# Patient Record
Sex: MALE | Race: BLACK OR AFRICAN AMERICAN | NOT HISPANIC OR LATINO | Employment: OTHER | URBAN - METROPOLITAN AREA
[De-identification: names, ages, dates, MRNs, and addresses within clinical notes are randomized per-mention and may not be internally consistent; named-entity substitution may affect disease eponyms.]

---

## 2019-09-19 ENCOUNTER — HOSPITAL ENCOUNTER (EMERGENCY)
Facility: HOSPITAL | Age: 64
Discharge: HOME | End: 2019-09-19
Attending: EMERGENCY MEDICINE
Payer: MEDICAID

## 2019-09-19 VITALS
SYSTOLIC BLOOD PRESSURE: 164 MMHG | TEMPERATURE: 98 F | OXYGEN SATURATION: 97 % | HEIGHT: 69 IN | HEART RATE: 81 BPM | BODY MASS INDEX: 28.58 KG/M2 | RESPIRATION RATE: 18 BRPM | DIASTOLIC BLOOD PRESSURE: 102 MMHG | WEIGHT: 193 LBS

## 2019-09-19 DIAGNOSIS — S01.511A LIP LACERATION, INITIAL ENCOUNTER: Primary | ICD-10-CM

## 2019-09-19 PROCEDURE — 90471 IMMUNIZATION ADMIN: CPT | Performed by: PHYSICIAN ASSISTANT

## 2019-09-19 PROCEDURE — 90715 TDAP VACCINE 7 YRS/> IM: CPT | Performed by: PHYSICIAN ASSISTANT

## 2019-09-19 PROCEDURE — 99282 EMERGENCY DEPT VISIT SF MDM: CPT | Mod: 25

## 2019-09-19 PROCEDURE — 63600175 PHARM REV CODE 636 W HCPCS: Performed by: PHYSICIAN ASSISTANT

## 2019-09-19 PROCEDURE — 12011 RPR F/E/E/N/L/M 2.5 CM/<: CPT

## 2019-09-19 PROCEDURE — 25000003 PHARM REV CODE 250: Performed by: PHYSICIAN ASSISTANT

## 2019-09-19 RX ORDER — LIDOCAINE HYDROCHLORIDE 10 MG/ML
10 INJECTION INFILTRATION; PERINEURAL
Status: COMPLETED | OUTPATIENT
Start: 2019-09-19 | End: 2019-09-19

## 2019-09-19 RX ORDER — LIDOCAINE HYDROCHLORIDE 20 MG/ML
5 SOLUTION OROPHARYNGEAL
Status: COMPLETED | OUTPATIENT
Start: 2019-09-19 | End: 2019-09-19

## 2019-09-19 RX ORDER — BACITRACIN ZINC 500 UNIT/G
OINTMENT (GRAM) TOPICAL 2 TIMES DAILY
Qty: 30 G | Refills: 0 | Status: SHIPPED | OUTPATIENT
Start: 2019-09-19

## 2019-09-19 RX ORDER — IBUPROFEN 400 MG/1
800 TABLET ORAL
Status: COMPLETED | OUTPATIENT
Start: 2019-09-19 | End: 2019-09-19

## 2019-09-19 RX ADMIN — IBUPROFEN 800 MG: 400 TABLET, FILM COATED ORAL at 07:09

## 2019-09-19 RX ADMIN — BACITRACIN, NEOMYCIN, POLYMYXIN B 1 EACH: 400; 3.5; 5 OINTMENT TOPICAL at 08:09

## 2019-09-19 RX ADMIN — LIDOCAINE HYDROCHLORIDE 5 ML: 20 SOLUTION ORAL; TOPICAL at 07:09

## 2019-09-19 RX ADMIN — CLOSTRIDIUM TETANI TOXOID ANTIGEN (FORMALDEHYDE INACTIVATED), CORYNEBACTERIUM DIPHTHERIAE TOXOID ANTIGEN (FORMALDEHYDE INACTIVATED), BORDETELLA PERTUSSIS TOXOID ANTIGEN (GLUTARALDEHYDE INACTIVATED), BORDETELLA PERTUSSIS FILAMENTOUS HEMAGGLUTININ ANTIGEN (FORMALDEHYDE INACTIVATED), BORDETELLA PERTUSSIS PERTACTIN ANTIGEN, AND BORDETELLA PERTUSSIS FIMBRIAE 2/3 ANTIGEN 0.5 ML: 5; 2; 2.5; 5; 3; 5 INJECTION, SUSPENSION INTRAMUSCULAR at 06:09

## 2019-09-19 RX ADMIN — LIDOCAINE HYDROCHLORIDE 10 ML: 10 INJECTION, SOLUTION INFILTRATION; PERINEURAL at 07:09
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
## 2019-09-20 NOTE — DISCHARGE INSTRUCTIONS
Apply bacitracin to the laceration twice daily.  Keep the area clean and dry. Wash with mild soap and water and pat dry immediately after.  Follow-up with your primary care physician for suture removal within 5-7 days.  Return to the ED for any concerning symptoms.    Our goal in the emergency department is to always give you outstanding care and exceptional service. You may receive a survey by mail or e-mail in the next week regarding your experience in our ED. We would greatly appreciate your completing and returning the survey. Your feedback provides us with a way to recognize our staff who give very good care and it helps us learn how to improve when your experience was below our aspiration of excellence.

## 2021-07-08 ENCOUNTER — HOSPITAL ENCOUNTER (EMERGENCY)
Facility: HOSPITAL | Age: 66
Discharge: HOME | End: 2021-07-08
Attending: EMERGENCY MEDICINE
Payer: MEDICARE

## 2021-07-08 VITALS
HEART RATE: 83 BPM | HEIGHT: 69 IN | DIASTOLIC BLOOD PRESSURE: 88 MMHG | WEIGHT: 190 LBS | RESPIRATION RATE: 18 BRPM | SYSTOLIC BLOOD PRESSURE: 140 MMHG | OXYGEN SATURATION: 98 % | TEMPERATURE: 98 F | BODY MASS INDEX: 28.14 KG/M2

## 2021-07-08 DIAGNOSIS — S82.54XA CLOSED NONDISPLACED FRACTURE OF MEDIAL MALLEOLUS OF RIGHT TIBIA, INITIAL ENCOUNTER: Primary | ICD-10-CM

## 2021-07-08 DIAGNOSIS — M25.571 RIGHT ANKLE PAIN: ICD-10-CM

## 2021-07-08 PROCEDURE — 29515 APPLICATION SHORT LEG SPLINT: CPT | Mod: RT

## 2021-07-08 PROCEDURE — 99283 EMERGENCY DEPT VISIT LOW MDM: CPT | Mod: 25

## 2021-07-08 PROCEDURE — 25000003 PHARM REV CODE 250: Performed by: PHYSICIAN ASSISTANT

## 2021-07-08 RX ORDER — HYDROCODONE BITARTRATE AND ACETAMINOPHEN 5; 325 MG/1; MG/1
1 TABLET ORAL
Status: COMPLETED | OUTPATIENT
Start: 2021-07-08 | End: 2021-07-08

## 2021-07-08 RX ORDER — OXYCODONE AND ACETAMINOPHEN 5; 325 MG/1; MG/1
1 TABLET ORAL EVERY 6 HOURS PRN
Qty: 12 TABLET | Refills: 0 | Status: SHIPPED | OUTPATIENT
Start: 2021-07-08 | End: 2022-02-23

## 2021-07-08 RX ADMIN — HYDROCODONE BITARTRATE AND ACETAMINOPHEN 1 TABLET: 5; 325 TABLET ORAL at 12:07

## 2021-07-30 ENCOUNTER — TELEPHONE (OUTPATIENT)
Dept: UROLOGY | Facility: CLINIC | Age: 66
End: 2021-07-30

## 2021-07-30 ENCOUNTER — OFFICE VISIT (OUTPATIENT)
Dept: UROLOGY | Facility: CLINIC | Age: 66
End: 2021-07-30
Payer: MEDICARE

## 2021-07-30 ENCOUNTER — LAB VISIT (OUTPATIENT)
Dept: LAB | Facility: HOSPITAL | Age: 66
End: 2021-07-30
Attending: STUDENT IN AN ORGANIZED HEALTH CARE EDUCATION/TRAINING PROGRAM
Payer: MEDICARE

## 2021-07-30 VITALS — BODY MASS INDEX: 27.75 KG/M2 | WEIGHT: 187.38 LBS | HEIGHT: 69 IN

## 2021-07-30 DIAGNOSIS — N52.9 ERECTILE DYSFUNCTION, UNSPECIFIED ERECTILE DYSFUNCTION TYPE: ICD-10-CM

## 2021-07-30 DIAGNOSIS — R35.1 BPH ASSOCIATED WITH NOCTURIA: Primary | ICD-10-CM

## 2021-07-30 DIAGNOSIS — R35.1 BPH ASSOCIATED WITH NOCTURIA: ICD-10-CM

## 2021-07-30 DIAGNOSIS — Z12.5 PROSTATE CANCER SCREENING: ICD-10-CM

## 2021-07-30 DIAGNOSIS — N40.1 BPH ASSOCIATED WITH NOCTURIA: ICD-10-CM

## 2021-07-30 DIAGNOSIS — N40.1 BPH ASSOCIATED WITH NOCTURIA: Primary | ICD-10-CM

## 2021-07-30 LAB — COMPLEXED PSA SERPL-MCNC: 0.75 NG/ML (ref 0–4)

## 2021-07-30 PROCEDURE — 3008F PR BODY MASS INDEX (BMI) DOCUMENTED: ICD-10-PCS | Mod: CPTII,S$GLB,, | Performed by: STUDENT IN AN ORGANIZED HEALTH CARE EDUCATION/TRAINING PROGRAM

## 2021-07-30 PROCEDURE — 99999 PR PBB SHADOW E&M-EST. PATIENT-LVL III: CPT | Mod: PBBFAC,,, | Performed by: STUDENT IN AN ORGANIZED HEALTH CARE EDUCATION/TRAINING PROGRAM

## 2021-07-30 PROCEDURE — 1160F RVW MEDS BY RX/DR IN RCRD: CPT | Mod: CPTII,S$GLB,, | Performed by: STUDENT IN AN ORGANIZED HEALTH CARE EDUCATION/TRAINING PROGRAM

## 2021-07-30 PROCEDURE — 99204 PR OFFICE/OUTPT VISIT, NEW, LEVL IV, 45-59 MIN: ICD-10-PCS | Mod: S$GLB,,, | Performed by: STUDENT IN AN ORGANIZED HEALTH CARE EDUCATION/TRAINING PROGRAM

## 2021-07-30 PROCEDURE — 99999 PR PBB SHADOW E&M-EST. PATIENT-LVL III: ICD-10-PCS | Mod: PBBFAC,,, | Performed by: STUDENT IN AN ORGANIZED HEALTH CARE EDUCATION/TRAINING PROGRAM

## 2021-07-30 PROCEDURE — 3288F FALL RISK ASSESSMENT DOCD: CPT | Mod: CPTII,S$GLB,, | Performed by: STUDENT IN AN ORGANIZED HEALTH CARE EDUCATION/TRAINING PROGRAM

## 2021-07-30 PROCEDURE — 3008F BODY MASS INDEX DOCD: CPT | Mod: CPTII,S$GLB,, | Performed by: STUDENT IN AN ORGANIZED HEALTH CARE EDUCATION/TRAINING PROGRAM

## 2021-07-30 PROCEDURE — 1126F PR PAIN SEVERITY QUANTIFIED, NO PAIN PRESENT: ICD-10-PCS | Mod: CPTII,S$GLB,, | Performed by: STUDENT IN AN ORGANIZED HEALTH CARE EDUCATION/TRAINING PROGRAM

## 2021-07-30 PROCEDURE — 1126F AMNT PAIN NOTED NONE PRSNT: CPT | Mod: CPTII,S$GLB,, | Performed by: STUDENT IN AN ORGANIZED HEALTH CARE EDUCATION/TRAINING PROGRAM

## 2021-07-30 PROCEDURE — 1100F PTFALLS ASSESS-DOCD GE2>/YR: CPT | Mod: CPTII,S$GLB,, | Performed by: STUDENT IN AN ORGANIZED HEALTH CARE EDUCATION/TRAINING PROGRAM

## 2021-07-30 PROCEDURE — 1160F PR REVIEW ALL MEDS BY PRESCRIBER/CLIN PHARMACIST DOCUMENTED: ICD-10-PCS | Mod: CPTII,S$GLB,, | Performed by: STUDENT IN AN ORGANIZED HEALTH CARE EDUCATION/TRAINING PROGRAM

## 2021-07-30 PROCEDURE — 1159F PR MEDICATION LIST DOCUMENTED IN MEDICAL RECORD: ICD-10-PCS | Mod: CPTII,S$GLB,, | Performed by: STUDENT IN AN ORGANIZED HEALTH CARE EDUCATION/TRAINING PROGRAM

## 2021-07-30 PROCEDURE — 99204 OFFICE O/P NEW MOD 45 MIN: CPT | Mod: S$GLB,,, | Performed by: STUDENT IN AN ORGANIZED HEALTH CARE EDUCATION/TRAINING PROGRAM

## 2021-07-30 PROCEDURE — 1159F MED LIST DOCD IN RCRD: CPT | Mod: CPTII,S$GLB,, | Performed by: STUDENT IN AN ORGANIZED HEALTH CARE EDUCATION/TRAINING PROGRAM

## 2021-07-30 PROCEDURE — 84153 ASSAY OF PSA TOTAL: CPT | Performed by: STUDENT IN AN ORGANIZED HEALTH CARE EDUCATION/TRAINING PROGRAM

## 2021-07-30 PROCEDURE — 3288F PR FALLS RISK ASSESSMENT DOCUMENTED: ICD-10-PCS | Mod: CPTII,S$GLB,, | Performed by: STUDENT IN AN ORGANIZED HEALTH CARE EDUCATION/TRAINING PROGRAM

## 2021-07-30 PROCEDURE — 1100F PR PT FALLS ASSESS DOC 2+ FALLS/FALL W/INJURY/YR: ICD-10-PCS | Mod: CPTII,S$GLB,, | Performed by: STUDENT IN AN ORGANIZED HEALTH CARE EDUCATION/TRAINING PROGRAM

## 2021-07-30 PROCEDURE — 36415 COLL VENOUS BLD VENIPUNCTURE: CPT | Performed by: STUDENT IN AN ORGANIZED HEALTH CARE EDUCATION/TRAINING PROGRAM

## 2021-07-30 RX ORDER — SILDENAFIL 100 MG/1
100 TABLET, FILM COATED ORAL DAILY PRN
Qty: 6 TABLET | Refills: 11 | Status: SHIPPED | OUTPATIENT
Start: 2021-07-30

## 2021-07-30 RX ORDER — SILDENAFIL 100 MG/1
100 TABLET, FILM COATED ORAL DAILY PRN
Qty: 6 TABLET | Refills: 11 | Status: SHIPPED | OUTPATIENT
Start: 2021-07-30 | End: 2021-07-30 | Stop reason: SDUPTHER

## 2021-08-02 ENCOUNTER — TELEPHONE (OUTPATIENT)
Dept: UROLOGY | Facility: CLINIC | Age: 66
End: 2021-08-02

## 2022-02-23 ENCOUNTER — HOSPITAL ENCOUNTER (OUTPATIENT)
Facility: HOSPITAL | Age: 67
Discharge: HOME | End: 2022-02-24
Attending: EMERGENCY MEDICINE | Admitting: EMERGENCY MEDICINE
Payer: MEDICARE

## 2022-02-23 DIAGNOSIS — R41.0 CONFUSION: ICD-10-CM

## 2022-02-23 DIAGNOSIS — G93.41 ENCEPHALOPATHY, METABOLIC: ICD-10-CM

## 2022-02-23 DIAGNOSIS — R41.82 ALTERED MENTAL STATUS: ICD-10-CM

## 2022-02-23 DIAGNOSIS — F20.89 OTHER SCHIZOPHRENIA: Primary | ICD-10-CM

## 2022-02-23 DIAGNOSIS — R07.9 CHEST PAIN: ICD-10-CM

## 2022-02-23 PROBLEM — E78.5 HLD (HYPERLIPIDEMIA): Status: ACTIVE | Noted: 2022-02-23

## 2022-02-23 PROBLEM — F20.9 SCHIZOPHRENIA: Status: ACTIVE | Noted: 2022-02-23

## 2022-02-23 PROBLEM — I10 HTN (HYPERTENSION): Status: ACTIVE | Noted: 2022-02-23

## 2022-02-23 LAB
ALBUMIN SERPL BCP-MCNC: 3.6 G/DL (ref 3.5–5.2)
ALP SERPL-CCNC: 103 U/L (ref 55–135)
ALT SERPL W/O P-5'-P-CCNC: 27 U/L (ref 10–44)
AMPHET+METHAMPHET UR QL: NEGATIVE
ANION GAP SERPL CALC-SCNC: 8 MMOL/L (ref 8–16)
AST SERPL-CCNC: 33 U/L (ref 10–40)
BARBITURATES UR QL SCN>200 NG/ML: NEGATIVE
BASOPHILS # BLD AUTO: 0.04 K/UL (ref 0–0.2)
BASOPHILS NFR BLD: 0.4 % (ref 0–1.9)
BENZODIAZ UR QL SCN>200 NG/ML: NEGATIVE
BILIRUB SERPL-MCNC: 0.2 MG/DL (ref 0.1–1)
BILIRUB UR QL STRIP: NEGATIVE
BNP SERPL-MCNC: 40 PG/ML (ref 0–99)
BUN SERPL-MCNC: 12 MG/DL (ref 8–23)
BZE UR QL SCN: NEGATIVE
CALCIUM SERPL-MCNC: 9.3 MG/DL (ref 8.7–10.5)
CANNABINOIDS UR QL SCN: NEGATIVE
CHLORIDE SERPL-SCNC: 107 MMOL/L (ref 95–110)
CK SERPL-CCNC: 622 U/L (ref 20–200)
CLARITY UR: CLEAR
CO2 SERPL-SCNC: 27 MMOL/L (ref 23–29)
COLOR UR: YELLOW
CREAT SERPL-MCNC: 0.8 MG/DL (ref 0.5–1.4)
CREAT UR-MCNC: 47.8 MG/DL (ref 23–375)
CTP QC/QA: YES
CTP QC/QA: YES
DIFFERENTIAL METHOD: ABNORMAL
EOSINOPHIL # BLD AUTO: 0.3 K/UL (ref 0–0.5)
EOSINOPHIL NFR BLD: 2.8 % (ref 0–8)
ERYTHROCYTE [DISTWIDTH] IN BLOOD BY AUTOMATED COUNT: 13.3 % (ref 11.5–14.5)
EST. GFR  (AFRICAN AMERICAN): >60 ML/MIN/1.73 M^2
EST. GFR  (NON AFRICAN AMERICAN): >60 ML/MIN/1.73 M^2
ETHANOL SERPL-MCNC: <10 MG/DL
GLUCOSE SERPL-MCNC: 171 MG/DL (ref 70–110)
GLUCOSE UR QL STRIP: ABNORMAL
HCT VFR BLD AUTO: 40.6 % (ref 40–54)
HGB BLD-MCNC: 13.4 G/DL (ref 14–18)
HGB UR QL STRIP: NEGATIVE
IMM GRANULOCYTES # BLD AUTO: 0.02 K/UL (ref 0–0.04)
IMM GRANULOCYTES NFR BLD AUTO: 0.2 % (ref 0–0.5)
KETONES UR QL STRIP: NEGATIVE
LACTATE SERPL-SCNC: 1.1 MMOL/L (ref 0.5–2.2)
LEUKOCYTE ESTERASE UR QL STRIP: NEGATIVE
LYMPHOCYTES # BLD AUTO: 2.5 K/UL (ref 1–4.8)
LYMPHOCYTES NFR BLD: 25.8 % (ref 18–48)
MAGNESIUM SERPL-MCNC: 1.9 MG/DL (ref 1.6–2.6)
MCH RBC QN AUTO: 30.7 PG (ref 27–31)
MCHC RBC AUTO-ENTMCNC: 33 G/DL (ref 32–36)
MCV RBC AUTO: 93 FL (ref 82–98)
METHADONE UR QL SCN>300 NG/ML: NEGATIVE
MONOCYTES # BLD AUTO: 0.9 K/UL (ref 0.3–1)
MONOCYTES NFR BLD: 9 % (ref 4–15)
NEUTROPHILS # BLD AUTO: 5.9 K/UL (ref 1.8–7.7)
NEUTROPHILS NFR BLD: 61.8 % (ref 38–73)
NITRITE UR QL STRIP: NEGATIVE
NRBC BLD-RTO: 0 /100 WBC
OPIATES UR QL SCN: ABNORMAL
PCP UR QL SCN>25 NG/ML: NEGATIVE
PH UR STRIP: 7 [PH] (ref 5–8)
PHOSPHATE SERPL-MCNC: 2.8 MG/DL (ref 2.7–4.5)
PLATELET # BLD AUTO: 304 K/UL (ref 150–450)
PMV BLD AUTO: 9.1 FL (ref 9.2–12.9)
POC MOLECULAR INFLUENZA A AGN: NEGATIVE
POC MOLECULAR INFLUENZA B AGN: NEGATIVE
POCT GLUCOSE: 105 MG/DL (ref 70–110)
POCT GLUCOSE: 125 MG/DL (ref 70–110)
POCT GLUCOSE: 167 MG/DL (ref 70–110)
POTASSIUM SERPL-SCNC: 3.5 MMOL/L (ref 3.5–5.1)
PROT SERPL-MCNC: 7.1 G/DL (ref 6–8.4)
PROT UR QL STRIP: NEGATIVE
RBC # BLD AUTO: 4.36 M/UL (ref 4.6–6.2)
SARS-COV-2 RDRP RESP QL NAA+PROBE: NEGATIVE
SODIUM SERPL-SCNC: 142 MMOL/L (ref 136–145)
SP GR UR STRIP: 1.01 (ref 1–1.03)
TOXICOLOGY INFORMATION: ABNORMAL
TROPONIN I SERPL DL<=0.01 NG/ML-MCNC: 0.02 NG/ML (ref 0–0.03)
TROPONIN I SERPL DL<=0.01 NG/ML-MCNC: 0.02 NG/ML (ref 0–0.03)
TSH SERPL DL<=0.005 MIU/L-ACNC: 1.93 UIU/ML (ref 0.4–4)
URN SPEC COLLECT METH UR: ABNORMAL
UROBILINOGEN UR STRIP-ACNC: NEGATIVE EU/DL
WBC # BLD AUTO: 9.51 K/UL (ref 3.9–12.7)

## 2022-02-23 PROCEDURE — 25000003 PHARM REV CODE 250: Performed by: EMERGENCY MEDICINE

## 2022-02-23 PROCEDURE — 83735 ASSAY OF MAGNESIUM: CPT | Performed by: EMERGENCY MEDICINE

## 2022-02-23 PROCEDURE — 96375 TX/PRO/DX INJ NEW DRUG ADDON: CPT

## 2022-02-23 PROCEDURE — 83880 ASSAY OF NATRIURETIC PEPTIDE: CPT | Performed by: EMERGENCY MEDICINE

## 2022-02-23 PROCEDURE — U0002 COVID-19 LAB TEST NON-CDC: HCPCS | Performed by: EMERGENCY MEDICINE

## 2022-02-23 PROCEDURE — 63600175 PHARM REV CODE 636 W HCPCS

## 2022-02-23 PROCEDURE — 82962 GLUCOSE BLOOD TEST: CPT

## 2022-02-23 PROCEDURE — 85025 COMPLETE CBC W/AUTO DIFF WBC: CPT | Performed by: EMERGENCY MEDICINE

## 2022-02-23 PROCEDURE — 82077 ASSAY SPEC XCP UR&BREATH IA: CPT | Performed by: EMERGENCY MEDICINE

## 2022-02-23 PROCEDURE — 84443 ASSAY THYROID STIM HORMONE: CPT | Performed by: EMERGENCY MEDICINE

## 2022-02-23 PROCEDURE — 84100 ASSAY OF PHOSPHORUS: CPT | Performed by: EMERGENCY MEDICINE

## 2022-02-23 PROCEDURE — 80053 COMPREHEN METABOLIC PANEL: CPT | Performed by: EMERGENCY MEDICINE

## 2022-02-23 PROCEDURE — 96374 THER/PROPH/DIAG INJ IV PUSH: CPT

## 2022-02-23 PROCEDURE — 93005 ELECTROCARDIOGRAM TRACING: CPT

## 2022-02-23 PROCEDURE — 81003 URINALYSIS AUTO W/O SCOPE: CPT | Mod: 59 | Performed by: EMERGENCY MEDICINE

## 2022-02-23 PROCEDURE — 63600175 PHARM REV CODE 636 W HCPCS: Performed by: EMERGENCY MEDICINE

## 2022-02-23 PROCEDURE — 83605 ASSAY OF LACTIC ACID: CPT | Performed by: EMERGENCY MEDICINE

## 2022-02-23 PROCEDURE — 87040 BLOOD CULTURE FOR BACTERIA: CPT | Mod: 59 | Performed by: EMERGENCY MEDICINE

## 2022-02-23 PROCEDURE — 80307 DRUG TEST PRSMV CHEM ANLYZR: CPT | Performed by: EMERGENCY MEDICINE

## 2022-02-23 PROCEDURE — G0378 HOSPITAL OBSERVATION PER HR: HCPCS

## 2022-02-23 PROCEDURE — 84484 ASSAY OF TROPONIN QUANT: CPT | Performed by: EMERGENCY MEDICINE

## 2022-02-23 PROCEDURE — 99291 CRITICAL CARE FIRST HOUR: CPT | Mod: 25,CS

## 2022-02-23 PROCEDURE — 93010 ELECTROCARDIOGRAM REPORT: CPT | Mod: ,,, | Performed by: INTERNAL MEDICINE

## 2022-02-23 PROCEDURE — 93010 EKG 12-LEAD: ICD-10-PCS | Mod: ,,, | Performed by: INTERNAL MEDICINE

## 2022-02-23 PROCEDURE — 82550 ASSAY OF CK (CPK): CPT | Performed by: EMERGENCY MEDICINE

## 2022-02-23 PROCEDURE — 96361 HYDRATE IV INFUSION ADD-ON: CPT

## 2022-02-23 RX ORDER — SITAGLIPTIN 100 MG/1
100 TABLET, FILM COATED ORAL DAILY
COMMUNITY
Start: 2021-11-24 | End: 2023-12-19 | Stop reason: CLARIF

## 2022-02-23 RX ORDER — EMPAGLIFLOZIN 10 MG/1
10 TABLET, FILM COATED ORAL DAILY
COMMUNITY
Start: 2021-12-27 | End: 2023-12-19 | Stop reason: CLARIF

## 2022-02-23 RX ORDER — HYDROCHLOROTHIAZIDE 25 MG/1
25 TABLET ORAL DAILY
COMMUNITY
Start: 2021-11-24 | End: 2023-12-19 | Stop reason: CLARIF

## 2022-02-23 RX ORDER — ATORVASTATIN CALCIUM 10 MG/1
20 TABLET, FILM COATED ORAL DAILY
Status: DISCONTINUED | OUTPATIENT
Start: 2022-02-24 | End: 2022-02-24 | Stop reason: HOSPADM

## 2022-02-23 RX ORDER — GLIPIZIDE 10 MG/1
TABLET, FILM COATED, EXTENDED RELEASE ORAL
COMMUNITY
Start: 2021-11-24

## 2022-02-23 RX ORDER — SODIUM CHLORIDE 0.9 % (FLUSH) 0.9 %
10 SYRINGE (ML) INJECTION EVERY 8 HOURS
Status: DISCONTINUED | OUTPATIENT
Start: 2022-02-23 | End: 2022-02-23

## 2022-02-23 RX ORDER — LORAZEPAM 2 MG/ML
INJECTION INTRAMUSCULAR
Status: COMPLETED
Start: 2022-02-23 | End: 2022-02-23

## 2022-02-23 RX ORDER — ACETAMINOPHEN 325 MG/1
650 TABLET ORAL EVERY 4 HOURS PRN
Status: DISCONTINUED | OUTPATIENT
Start: 2022-02-23 | End: 2022-02-24 | Stop reason: HOSPADM

## 2022-02-23 RX ORDER — AMOXICILLIN 250 MG
1 CAPSULE ORAL 2 TIMES DAILY
Status: DISCONTINUED | OUTPATIENT
Start: 2022-02-23 | End: 2022-02-23

## 2022-02-23 RX ORDER — LISINOPRIL 10 MG/1
10 TABLET ORAL
COMMUNITY

## 2022-02-23 RX ORDER — IBUPROFEN 200 MG
24 TABLET ORAL
Status: DISCONTINUED | OUTPATIENT
Start: 2022-02-23 | End: 2022-02-24 | Stop reason: HOSPADM

## 2022-02-23 RX ORDER — DIPHENHYDRAMINE HYDROCHLORIDE 50 MG/ML
INJECTION INTRAMUSCULAR; INTRAVENOUS
Status: DISPENSED
Start: 2022-02-23 | End: 2022-02-24

## 2022-02-23 RX ORDER — SIMVASTATIN 20 MG/1
20 TABLET, FILM COATED ORAL NIGHTLY
COMMUNITY
Start: 2021-11-24

## 2022-02-23 RX ORDER — ACETAMINOPHEN 500 MG
1000 TABLET ORAL
Status: COMPLETED | OUTPATIENT
Start: 2022-02-23 | End: 2022-02-23

## 2022-02-23 RX ORDER — METOPROLOL SUCCINATE 25 MG/1
25 TABLET, EXTENDED RELEASE ORAL DAILY
Status: DISCONTINUED | OUTPATIENT
Start: 2022-02-24 | End: 2022-02-24 | Stop reason: HOSPADM

## 2022-02-23 RX ORDER — INSULIN ASPART 100 [IU]/ML
0-5 INJECTION, SOLUTION INTRAVENOUS; SUBCUTANEOUS
Status: DISCONTINUED | OUTPATIENT
Start: 2022-02-23 | End: 2022-02-24 | Stop reason: HOSPADM

## 2022-02-23 RX ORDER — GLUCAGON 1 MG
1 KIT INJECTION
Status: DISCONTINUED | OUTPATIENT
Start: 2022-02-23 | End: 2022-02-24 | Stop reason: HOSPADM

## 2022-02-23 RX ORDER — LANOLIN ALCOHOL/MO/W.PET/CERES
800 CREAM (GRAM) TOPICAL
Status: DISCONTINUED | OUTPATIENT
Start: 2022-02-23 | End: 2022-02-24 | Stop reason: HOSPADM

## 2022-02-23 RX ORDER — FLUOXETINE HYDROCHLORIDE 20 MG/1
20 CAPSULE ORAL DAILY
COMMUNITY
Start: 2021-11-24

## 2022-02-23 RX ORDER — SODIUM CHLORIDE 0.9 % (FLUSH) 0.9 %
10 SYRINGE (ML) INJECTION
Status: DISCONTINUED | OUTPATIENT
Start: 2022-02-23 | End: 2022-02-24 | Stop reason: HOSPADM

## 2022-02-23 RX ORDER — METFORMIN HYDROCHLORIDE 750 MG/1
TABLET, EXTENDED RELEASE ORAL
COMMUNITY

## 2022-02-23 RX ORDER — TALC
6 POWDER (GRAM) TOPICAL NIGHTLY PRN
Status: DISCONTINUED | OUTPATIENT
Start: 2022-02-23 | End: 2022-02-24 | Stop reason: HOSPADM

## 2022-02-23 RX ORDER — QUETIAPINE 200 MG/1
200 TABLET, FILM COATED, EXTENDED RELEASE ORAL NIGHTLY
COMMUNITY
Start: 2022-02-22 | End: 2023-12-19 | Stop reason: CLARIF

## 2022-02-23 RX ORDER — METOPROLOL TARTRATE 25 MG/1
25 TABLET, FILM COATED ORAL 2 TIMES DAILY
COMMUNITY
Start: 2021-11-24 | End: 2023-12-19 | Stop reason: CLARIF

## 2022-02-23 RX ORDER — IBUPROFEN 200 MG
16 TABLET ORAL
Status: DISCONTINUED | OUTPATIENT
Start: 2022-02-23 | End: 2022-02-24 | Stop reason: HOSPADM

## 2022-02-23 RX ORDER — AMLODIPINE BESYLATE 10 MG/1
10 TABLET ORAL DAILY
COMMUNITY
Start: 2021-11-24

## 2022-02-23 RX ORDER — SODIUM CHLORIDE 9 MG/ML
1000 INJECTION, SOLUTION INTRAVENOUS
Status: COMPLETED | OUTPATIENT
Start: 2022-02-23 | End: 2022-02-23

## 2022-02-23 RX ORDER — DIPHENHYDRAMINE HYDROCHLORIDE 50 MG/ML
25 INJECTION INTRAMUSCULAR; INTRAVENOUS
Status: COMPLETED | OUTPATIENT
Start: 2022-02-23 | End: 2022-02-23

## 2022-02-23 RX ORDER — LORAZEPAM 2 MG/ML
1 INJECTION INTRAMUSCULAR
Status: COMPLETED | OUTPATIENT
Start: 2022-02-23 | End: 2022-02-23

## 2022-02-23 RX ORDER — NALOXONE HCL 0.4 MG/ML
0.02 VIAL (ML) INJECTION
Status: DISCONTINUED | OUTPATIENT
Start: 2022-02-23 | End: 2022-02-24 | Stop reason: HOSPADM

## 2022-02-23 RX ORDER — AMOXICILLIN 250 MG
1 CAPSULE ORAL DAILY PRN
Status: DISCONTINUED | OUTPATIENT
Start: 2022-02-23 | End: 2022-02-24 | Stop reason: HOSPADM

## 2022-02-23 RX ORDER — HYDROCODONE BITARTRATE AND ACETAMINOPHEN 10; 325 MG/1; MG/1
TABLET ORAL
COMMUNITY
Start: 2022-02-19 | End: 2023-12-19 | Stop reason: CLARIF

## 2022-02-23 RX ORDER — TIZANIDINE 4 MG/1
4 TABLET ORAL EVERY 8 HOURS
COMMUNITY
Start: 2021-12-08 | End: 2023-12-19 | Stop reason: CLARIF

## 2022-02-23 RX ADMIN — SODIUM CHLORIDE 1000 ML: 0.9 INJECTION, SOLUTION INTRAVENOUS at 08:02

## 2022-02-23 RX ADMIN — DIPHENHYDRAMINE HYDROCHLORIDE 25 MG: 50 INJECTION INTRAMUSCULAR; INTRAVENOUS at 03:02

## 2022-02-23 RX ADMIN — LORAZEPAM 1 MG: 2 INJECTION INTRAMUSCULAR; INTRAVENOUS at 03:02

## 2022-02-23 RX ADMIN — ACETAMINOPHEN 1000 MG: 500 TABLET ORAL at 08:02

## 2022-02-23 RX ADMIN — LORAZEPAM 1 MG: 2 INJECTION INTRAMUSCULAR at 03:02

## 2022-02-23 RX ADMIN — SODIUM CHLORIDE 1000 ML: 0.9 INJECTION, SOLUTION INTRAVENOUS at 11:02
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
## 2022-02-23 NOTE — ASSESSMENT & PLAN NOTE
Arrived confused with increased lethargy from home. Patient unsure of what occurred yesterday or today. Family phone number without answer CT head without acute process, UDS positive for opiates. Filled hydrocodone on 2/2021. Possibly related to medication use. Chest x-ray with nonspecific opacities  -started on Incentive spirometer as suspect chest x-ray due to poor inspiratory effort  -neurology consulted  -holding home seroquel and hydrocodone  -neuro checks/fall precautions/aspiration precautions    ADDENDUM after admission became agitated and attempted to leave. He required sediation with ativan and benadryl at QTc 495. He is unable to state the risks of leave AMA, but cannot elaborate on the risks of leaving. He was PEC in the ED. Psychiatry consult place.

## 2022-02-23 NOTE — ED NOTES
Patient's wife (Tina) requesting status update. Notified wife that patient will be getting admitted. [phone]

## 2022-02-23 NOTE — HPI
Moy Escalona 66 y.o. male with Htn, HLD, DM, and schizophrenia presents to hospital chief complaint of altered mental status.  He is unsure what brought him to hospital:  [place] wife activated ambulance and she has a recent issue.  He denies any complaints at this time.  His not remember any events from yesterday or today.  He denies any new medications, but is unable to state the name of his medications at home.  He smokes 1/2 pack per day.  He drinks every 2 weeks.  He denies fever nausea vomiting abdominal pain looks like syncope dizziness dysuria and no hematuria facial droop and slurred speech numbness or Weakness of an extremity.    In the ED, troponin negative, without acute process, TSH within normal limits, lactic acid negative, to have acute process, chest x-ray with nonspecific opacities, BNP negative, .

## 2022-02-23 NOTE — ED NOTES
"Attempted to give patient P.O Tylenol and start fluid bolus. Pt stated "I don't want no medicine that I don't know nothing about" Explained to patient that he had a fever and needed the Tylenol to get the fever down. Pt then stated "I'm good, I dont need none of that stuff, I dont know why she (his wife) is doing all that, she the one that needs to be up in here, not me. I'm fine, I wanna go home"  "

## 2022-02-23 NOTE — ASSESSMENT & PLAN NOTE
Unclear as to home medications, patient unsure of name. Will hold home seroquel listed on chart for lethargy

## 2022-02-23 NOTE — H&P
[facility] [place] Emergency Dept  [facility]  History & Physical    Patient Name: Moy Escalona  MRN: [id number]  Patient Class: OP- Observation  Admission Date: 2/23/2022  Attending Physician: Diane Back MD   Primary Care Provider: Son PRASHANT MD Adenike         Patient information was obtained from patient, past medical records and ER records.     Subjective:     Principal Problem:Encephalopathy, metabolic    Chief Complaint:   Chief Complaint   Patient presents with    Altered Mental Status     Ems called to 67yo male that wife stated had a sudden onset of confusion and AMS last night at unknown time. Stated he was walking aorund the house taking clothes off and very confused. At triage patient was alert and to self and place only and was very sleepy and nodding off frequently. EMS stated he was tachycardic and spo2 was 92% RA. He was 95% on 4L o2 NC. Cbg was 167 and he is on pain meds but denied he had taken them today.         HPI: Moy Escalona 66 y.o. male with Htn, HLD, DM, and schizophrenia presents to hospital chief complaint of altered mental status.  He is unsure what brought him to hospital:  Moshe wife activated ambulance and she has a recent issue.  He denies any complaints at this time.  His not remember any events from yesterday or today.  He denies any new medications, but is unable to state the name of his medications at home.  He smokes 1/2 pack per day.  He drinks every 2 weeks.  He denies fever nausea vomiting abdominal pain looks like syncope dizziness dysuria and no hematuria facial droop and slurred speech numbness or Weakness of an extremity.    In the ED, troponin negative, without acute process, TSH within normal limits, lactic acid negative, to have acute process, chest x-ray with nonspecific opacities, BNP negative, .       Past Medical History:   Diagnosis Date    AMS (altered mental status) 02/23/2022    Diabetes mellitus     High cholesterol     Hyperlipidemia      Hypertension     Paranoid schizophrenia        Past Surgical History:   Procedure Laterality Date    ABDOMINAL SURGERY         Review of patient's allergies indicates:  No Known Allergies    No current facility-administered medications on file prior to encounter.     Current Outpatient Medications on File Prior to Encounter   Medication Sig    atorvastatin (LIPITOR) 20 MG tablet Take 20 mg by mouth once daily.    bacitracin 500 unit/gram Oint Apply topically 2 (two) times daily.    aluminum-magnesium hydroxide-simethicone (MAALOX ADVANCED) 200-200-20 mg/5 mL Susp Take 30 mLs by mouth 4 (four) times daily before meals and nightly.    famotidine (PEPCID) 20 MG tablet Take 1 tablet (20 mg total) by mouth 2 (two) times daily.    glipiZIDE (GLUCOTROL) 10 MG tablet Take 10 mg by mouth 2 (two) times daily before meals.    glipiZIDE (GLUCOTROL) 10 MG TR24 Take one tablet by mouth daily (FOR DIABETES)    HYDROcodone-acetaminophen (NORCO)  mg per tablet TAKE 1 TABLET BY MOUTH EVERY 12 HOURS AS NEEDED FOR PAIN    hydrocodone-acetaminophen 5-325mg (NORCO) 5-325 mg per tablet Take 1 tablet by mouth every 6 (six) hours as needed for Pain.    lovastatin (MEVACOR) 40 MG tablet Take 40 mg by mouth every evening.    metformin (GLUCOPHAGE) 1000 MG tablet Take 1,000 mg by mouth 2 (two) times daily with meals.    metFORMIN (GLUCOPHAGE-XR) 750 MG ER 24hr tablet     metoprolol succinate (TOPROL-XL) 25 MG 24 hr tablet Take 25 mg by mouth once daily.    oxyCODONE-acetaminophen (PERCOCET) 5-325 mg per tablet Take 1 tablet by mouth every 6 (six) hours as needed for Pain.    quetiapine (SEROQUEL XR) 400 MG Tb24 Take by mouth once daily.    quetiapine 200 mg oral tb24 (SEROQUEL XR) 200 MG Tb24 Take 200 mg by mouth nightly.    sildenafiL (VIAGRA) 100 MG tablet Take 1 tablet (100 mg total) by mouth daily as needed for Erectile Dysfunction. Take on empty stomach, 1 hour prior to intercourse. Report to ED for erections  greater than 4 hours     Family History       Problem Relation (Age of Onset)    Cancer Mother          Tobacco Use    Smoking status: Current Every Day Smoker     Packs/day: 0.50     Types: Cigarettes    Smokeless tobacco: Never Used   Substance and Sexual Activity    Alcohol use: Yes     Comment: occasionally    Drug use: No    Sexual activity: Yes     Partners: Female     Review of Systems   Unable to perform ROS: Mental status change   Objective:     Vital Signs (Most Recent):  Temp: 100.1 °F (37.8 °C) (02/23/22 0858)  Pulse: 87 (02/23/22 1502)  Resp: (!) 21 (02/23/22 1502)  BP: (!) 146/87 (02/23/22 1502)  SpO2: (!) 89 % (02/23/22 1502)   Vital Signs (24h Range):  Temp:  [99.3 °F (37.4 °C)-100.1 °F (37.8 °C)] 100.1 °F (37.8 °C)  Pulse:  [] 87  Resp:  [12-21] 21  SpO2:  [89 %-95 %] 89 %  BP: (126-148)/(73-87) 146/87     Weight: 90.7 kg (200 lb)  Body mass index is 29.53 kg/m².    Physical Exam  Vitals and nursing note reviewed.   Constitutional:       General: He is not in acute distress.     Appearance: He is well-developed.   HENT:      Head: Normocephalic and atraumatic.   Eyes:      General:         Right eye: No discharge.         Left eye: No discharge.      Conjunctiva/sclera: Conjunctivae normal.   Neck:      Thyroid: No thyromegaly.   Cardiovascular:      Rate and Rhythm: Normal rate and regular rhythm.      Heart sounds: No murmur heard.  Pulmonary:      Effort: Pulmonary effort is normal. No respiratory distress.      Breath sounds: Normal breath sounds.   Abdominal:      General: Bowel sounds are normal. There is no distension.      Palpations: Abdomen is soft. There is no mass.      Tenderness: There is no abdominal tenderness.   Musculoskeletal:         General: No deformity.      Cervical back: Normal range of motion.   Skin:     General: Skin is warm and dry.   Neurological:      Mental Status: He is alert and oriented to person, place, and time.      Comments: 5/5 strength BUE and BLE.  No sensation deficit. No past pointing on finger to nose. Heel to shin intact. Unaware of events that occurred today.   Psychiatric:         Behavior: Behavior normal.           Significant Labs: CBC:   Recent Labs   Lab 02/23/22  0815   WBC 9.51   HGB 13.4*   HCT 40.6        CMP:   Recent Labs   Lab 02/23/22  0815      K 3.5      CO2 27   *   BUN 12   CREATININE 0.8   CALCIUM 9.3   PROT 7.1   ALBUMIN 3.6   BILITOT 0.2   ALKPHOS 103   AST 33   ALT 27   ANIONGAP 8   EGFRNONAA >60     Cardiac Markers:   Recent Labs   Lab 02/23/22  0816   BNP 40     Lactic Acid:   Recent Labs   Lab 02/23/22  0815   LACTATE 1.1     Troponin:   Recent Labs   Lab 02/23/22  0816 02/23/22  1320   TROPONINI 0.021 0.018     TSH:   Recent Labs   Lab 02/23/22  0815   TSH 1.928     Urine Studies:   Recent Labs   Lab 02/23/22  1017   COLORU Yellow   APPEARANCEUA Clear   PHUR 7.0   SPECGRAV 1.010   PROTEINUA Negative   GLUCUA Trace*   KETONESU Negative   BILIRUBINUA Negative   OCCULTUA Negative   NITRITE Negative   UROBILINOGEN Negative   LEUKOCYTESUR Negative       Significant Imaging:   Imaging Results              X-Ray Chest AP Portable (Final result)  Result time 02/23/22 08:59:53      Final result by Thomas Gimenez MD (02/23/22 08:59:53)                   Impression:      Nonspecific alveolar and interstitial opacities could relate to a combination of edema, atelectasis, pneumonia, and/or noninfectious inflammatory process.    Small pleural effusions.      Electronically signed by: Thomas Gimenez  Date:    02/23/2022  Time:    08:59               Narrative:    EXAMINATION:  XR CHEST AP PORTABLE    CLINICAL HISTORY:  Altered mental status, unspecified    TECHNIQUE:  Single frontal view of the chest was performed.    COMPARISON:  Chest radiograph performed 03/09/2016.    FINDINGS:  Monitoring leads overlie the chest.  Cardiomediastinal contours appear grossly unchanged.  Patchy interstitial alveolar opacities are  noted.  Suggested small bilateral pleural effusions.  No definite pneumothorax.  No acute findings identified in the visualized abdomen.  Osseous and soft tissue structures appear without definite acute change.                                       CT Head Without Contrast (Final result)  Result time 02/23/22 08:39:27      Final result by Thomas Gimenez MD (02/23/22 08:39:27)                   Impression:      No definite acute intracranial findings identified.      Electronically signed by: Thomas Gimenez  Date:    02/23/2022  Time:    08:39               Narrative:    EXAMINATION:  CT HEAD WITHOUT CONTRAST    CLINICAL HISTORY:  Mental status change, unknown cause;    TECHNIQUE:  Low dose axial images were obtained through the head.  Coronal and sagittal reformations were also performed. Contrast was not administered.    COMPARISON:  None.    FINDINGS:  Blood: No acute intracranial hemorrhage.    Parenchyma: No definite loss of gray-white differentiation to suggest acute or subacute transcortical infarct. Generalized pattern age-related parenchymal volume loss.  Nonspecific areas of white matter hypoattenuation may relate to sequela of chronic small vessel ischemic disease.    Ventricles/Extra-axial spaces: No abnormal extra-axial fluid collection. Basal cisterns are patent.    Vessels: Vascular calcifications.    Orbits: Unremarkable.    Scalp: Unremarkable.    Skull: There are no depressed skull fractures or destructive bone lesions.    Sinuses and mastoids: Essentially clear.    Other findings: None                                        Assessment/Plan:     * Encephalopathy, metabolic  Arrived confused with increased lethargy from home. Patient unsure of what occurred yesterday or today. Family phone number without answer CT head without acute process, UDS positive for opiates. Filled hydrocodone on 2/2021. Possibly related to medication use. Chest x-ray with nonspecific opacities  -started on Incentive  spirometer as suspect chest x-ray due to poor inspiratory effort  -neurology consulted  -holding home seroquel and hydrocodone  -neuro checks/fall precautions/aspiration precautions    ADDENDUM after admission became agitated and attempted to leave. He required sediation with ativan and benadryl at QTc 495. He is unable to state the risks of leave AMA, but cannot elaborate on the risks of leaving. He was PEC in the ED. Psychiatry consult place.    HLD (hyperlipidemia)  Continue home lipitor    HTN (hypertension)  Fair control. Per chart review on metoprolol. Patient unsure of name of medication  Will continue home metoprolol    Schizophrenia  Unclear as to home medications, patient unsure of name. Will hold home seroquel listed on chart for lethargy    VTE Risk Mitigation (From admission, onward)         Ordered     IP VTE LOW RISK PATIENT  Once         02/23/22 1435     Place sequential compression device  Until discontinued         02/23/22 1435              VTE: SCD  Code: Full  Diet: diabetic  Dispo: pending neurology eval  As clarification, on 2/23/2022, patient should be admitted for hospital observation services under my care in collaboration with Lauri Dorman MD. Jelani Hsieh PA-C  Department of Hospital Medicine   [facility] [place] - Emergency Dept

## 2022-02-23 NOTE — NURSING
Pt arrived to room 431, sleeping, restraints dc in ED, VSS, on 2L NC O2, placed on telebox [id number], monitor tech notified. Sitter at bedside.
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
## 2022-02-23 NOTE — ASSESSMENT & PLAN NOTE
Fair control. Per chart review on metoprolol. Patient unsure of name of medication  Will continue home metoprolol

## 2022-02-23 NOTE — PHARMACY MED REC
"Admission Medication History     The home medication history was taken by Jennifer Garcia CPhT.      You may go to "Admission" then "Reconcile Home Medications" tabs to review and/or act upon these items.      The home medication list has been updated by the Pharmacy department.    Please read ALL comments highlighted in yellow.    Please address this information as you see fit.     Feel free to contact us if you have any questions or require assistance.      The medications listed below were removed from the home medication list. Please reorder if appropriate:  Patient reports no longer taking the following medication(s):   Glucotrol 10 mg   Mevacor 40 mg   Toprolol XL 25 mg   Seroquel 400 mg    Norco 5-325 mg   Percocet 5-325 mg   pepcid 20 mg   Maalox Advanced        Medications listed below were obtained from: Patient/family, [employer]- [employer] and Patient's pharmacy  (Not in a hospital admission)      Potential issues to be addressed PRIOR TO DISCHARGE  Please discuss with the patient barriers to adherence with medication treatment plans  Patient requires education regarding drug therapies     Jennifer Garcia [facility].  [phone]                    .        "

## 2022-02-23 NOTE — SUBJECTIVE & OBJECTIVE
Past Medical History:   Diagnosis Date    AMS (altered mental status) 02/23/2022    Diabetes mellitus     High cholesterol     Hyperlipidemia     Hypertension     Paranoid schizophrenia        Past Surgical History:   Procedure Laterality Date    ABDOMINAL SURGERY         Review of patient's allergies indicates:  No Known Allergies    No current facility-administered medications on file prior to encounter.     Current Outpatient Medications on File Prior to Encounter   Medication Sig    atorvastatin (LIPITOR) 20 MG tablet Take 20 mg by mouth once daily.    bacitracin 500 unit/gram Oint Apply topically 2 (two) times daily.    aluminum-magnesium hydroxide-simethicone (MAALOX ADVANCED) 200-200-20 mg/5 mL Susp Take 30 mLs by mouth 4 (four) times daily before meals and nightly.    famotidine (PEPCID) 20 MG tablet Take 1 tablet (20 mg total) by mouth 2 (two) times daily.    glipiZIDE (GLUCOTROL) 10 MG tablet Take 10 mg by mouth 2 (two) times daily before meals.    glipiZIDE (GLUCOTROL) 10 MG TR24 Take one tablet by mouth daily (FOR DIABETES)    HYDROcodone-acetaminophen (NORCO)  mg per tablet TAKE 1 TABLET BY MOUTH EVERY 12 HOURS AS NEEDED FOR PAIN    hydrocodone-acetaminophen 5-325mg (NORCO) 5-325 mg per tablet Take 1 tablet by mouth every 6 (six) hours as needed for Pain.    lovastatin (MEVACOR) 40 MG tablet Take 40 mg by mouth every evening.    metformin (GLUCOPHAGE) 1000 MG tablet Take 1,000 mg by mouth 2 (two) times daily with meals.    metFORMIN (GLUCOPHAGE-XR) 750 MG ER 24hr tablet     metoprolol succinate (TOPROL-XL) 25 MG 24 hr tablet Take 25 mg by mouth once daily.    oxyCODONE-acetaminophen (PERCOCET) 5-325 mg per tablet Take 1 tablet by mouth every 6 (six) hours as needed for Pain.    quetiapine (SEROQUEL XR) 400 MG Tb24 Take by mouth once daily.    quetiapine 200 mg oral tb24 (SEROQUEL XR) 200 MG Tb24 Take 200 mg by mouth nightly.    sildenafiL (VIAGRA) 100 MG tablet Take 1 tablet (100 mg total) by  mouth daily as needed for Erectile Dysfunction. Take on empty stomach, 1 hour prior to intercourse. Report to ED for erections greater than 4 hours     Family History       Problem Relation (Age of Onset)    Cancer Mother          Tobacco Use    Smoking status: Current Every Day Smoker     Packs/day: 0.50     Types: Cigarettes    Smokeless tobacco: Never Used   Substance and Sexual Activity    Alcohol use: Yes     Comment: occasionally    Drug use: No    Sexual activity: Yes     Partners: Female     Review of Systems   Unable to perform ROS: Mental status change   Objective:     Vital Signs (Most Recent):  Temp: 100.1 °F (37.8 °C) (02/23/22 0858)  Pulse: 87 (02/23/22 1502)  Resp: (!) 21 (02/23/22 1502)  BP: (!) 146/87 (02/23/22 1502)  SpO2: (!) 89 % (02/23/22 1502)   Vital Signs (24h Range):  Temp:  [99.3 °F (37.4 °C)-100.1 °F (37.8 °C)] 100.1 °F (37.8 °C)  Pulse:  [] 87  Resp:  [12-21] 21  SpO2:  [89 %-95 %] 89 %  BP: (126-148)/(73-87) 146/87     Weight: 90.7 kg (200 lb)  Body mass index is 29.53 kg/m².    Physical Exam  Vitals and nursing note reviewed.   Constitutional:       General: He is not in acute distress.     Appearance: He is well-developed.   HENT:      Head: Normocephalic and atraumatic.   Eyes:      General:         Right eye: No discharge.         Left eye: No discharge.      Conjunctiva/sclera: Conjunctivae normal.   Neck:      Thyroid: No thyromegaly.   Cardiovascular:      Rate and Rhythm: Normal rate and regular rhythm.      Heart sounds: No murmur heard.  Pulmonary:      Effort: Pulmonary effort is normal. No respiratory distress.      Breath sounds: Normal breath sounds.   Abdominal:      General: Bowel sounds are normal. There is no distension.      Palpations: Abdomen is soft. There is no mass.      Tenderness: There is no abdominal tenderness.   Musculoskeletal:         General: No deformity.      Cervical back: Normal range of motion.   Skin:     General: Skin is warm and dry.    Neurological:      Mental Status: He is alert and oriented to person, place, and time.      Comments: 5/5 strength BUE and BLE. No sensation deficit. No past pointing on finger to nose. Heel to shin intact. Unaware of events that occurred today.   Psychiatric:         Behavior: Behavior normal.           Significant Labs: CBC:   Recent Labs   Lab 02/23/22  0815   WBC 9.51   HGB 13.4*   HCT 40.6        CMP:   Recent Labs   Lab 02/23/22  0815      K 3.5      CO2 27   *   BUN 12   CREATININE 0.8   CALCIUM 9.3   PROT 7.1   ALBUMIN 3.6   BILITOT 0.2   ALKPHOS 103   AST 33   ALT 27   ANIONGAP 8   EGFRNONAA >60     Cardiac Markers:   Recent Labs   Lab 02/23/22  0816   BNP 40     Lactic Acid:   Recent Labs   Lab 02/23/22  0815   LACTATE 1.1     Troponin:   Recent Labs   Lab 02/23/22  0816 02/23/22  1320   TROPONINI 0.021 0.018     TSH:   Recent Labs   Lab 02/23/22  0815   TSH 1.928     Urine Studies:   Recent Labs   Lab 02/23/22  1017   COLORU Yellow   APPEARANCEUA Clear   PHUR 7.0   SPECGRAV 1.010   PROTEINUA Negative   GLUCUA Trace*   KETONESU Negative   BILIRUBINUA Negative   OCCULTUA Negative   NITRITE Negative   UROBILINOGEN Negative   LEUKOCYTESUR Negative       Significant Imaging:   Imaging Results              X-Ray Chest AP Portable (Final result)  Result time 02/23/22 08:59:53      Final result by Thomas Gimenez MD (02/23/22 08:59:53)                   Impression:      Nonspecific alveolar and interstitial opacities could relate to a combination of edema, atelectasis, pneumonia, and/or noninfectious inflammatory process.    Small pleural effusions.      Electronically signed by: Thomas Gimenez  Date:    02/23/2022  Time:    08:59               Narrative:    EXAMINATION:  XR CHEST AP PORTABLE    CLINICAL HISTORY:  Altered mental status, unspecified    TECHNIQUE:  Single frontal view of the chest was performed.    COMPARISON:  Chest radiograph performed  03/09/2016.    FINDINGS:  Monitoring leads overlie the chest.  Cardiomediastinal contours appear grossly unchanged.  Patchy interstitial alveolar opacities are noted.  Suggested small bilateral pleural effusions.  No definite pneumothorax.  No acute findings identified in the visualized abdomen.  Osseous and soft tissue structures appear without definite acute change.                                       CT Head Without Contrast (Final result)  Result time 02/23/22 08:39:27      Final result by Thomas Gimenez MD (02/23/22 08:39:27)                   Impression:      No definite acute intracranial findings identified.      Electronically signed by: Thomas Gimenez  Date:    02/23/2022  Time:    08:39               Narrative:    EXAMINATION:  CT HEAD WITHOUT CONTRAST    CLINICAL HISTORY:  Mental status change, unknown cause;    TECHNIQUE:  Low dose axial images were obtained through the head.  Coronal and sagittal reformations were also performed. Contrast was not administered.    COMPARISON:  None.    FINDINGS:  Blood: No acute intracranial hemorrhage.    Parenchyma: No definite loss of gray-white differentiation to suggest acute or subacute transcortical infarct. Generalized pattern age-related parenchymal volume loss.  Nonspecific areas of white matter hypoattenuation may relate to sequela of chronic small vessel ischemic disease.    Ventricles/Extra-axial spaces: No abnormal extra-axial fluid collection. Basal cisterns are patent.    Vessels: Vascular calcifications.    Orbits: Unremarkable.    Scalp: Unremarkable.    Skull: There are no depressed skull fractures or destructive bone lesions.    Sinuses and mastoids: Essentially clear.    Other findings: None

## 2022-02-23 NOTE — ED NOTES
PEC SCANNED INTO CHART  DOCUMENTS FAXED TO [facility]  PT BELONGINGS DOCUMENTED  STICKER IN THE RED BOOK  [facility]ERS NOTIFIED OF PT PEC.

## 2022-02-23 NOTE — ED TRIAGE NOTES
Moy Escalona, a 66 y.o. male presents to the ED via EMS with CC of AMS. Per EMS, pt's wife called stating the patient was having bizarre behavior and AMS. Last seen normal was yesterday. Pt is oriented to place, disoriented to situation and time. Per EMS,  in the field.Pt sating at 91% on room air.

## 2022-02-23 NOTE — ED NOTES
Called floor RN that will be receiving pt for pt update. Since pt is now PEC'd pt must now go to another room, awaiting room placement.

## 2022-02-24 VITALS
DIASTOLIC BLOOD PRESSURE: 88 MMHG | WEIGHT: 187.38 LBS | HEART RATE: 95 BPM | RESPIRATION RATE: 18 BRPM | HEIGHT: 69 IN | BODY MASS INDEX: 27.75 KG/M2 | OXYGEN SATURATION: 91 % | SYSTOLIC BLOOD PRESSURE: 144 MMHG | TEMPERATURE: 98 F

## 2022-02-24 LAB
ANION GAP SERPL CALC-SCNC: 8 MMOL/L (ref 8–16)
BUN SERPL-MCNC: 6 MG/DL (ref 8–23)
CALCIUM SERPL-MCNC: 8.9 MG/DL (ref 8.7–10.5)
CHLORIDE SERPL-SCNC: 108 MMOL/L (ref 95–110)
CK SERPL-CCNC: 230 U/L (ref 20–200)
CO2 SERPL-SCNC: 27 MMOL/L (ref 23–29)
CREAT SERPL-MCNC: 0.7 MG/DL (ref 0.5–1.4)
EST. GFR  (AFRICAN AMERICAN): >60 ML/MIN/1.73 M^2
EST. GFR  (NON AFRICAN AMERICAN): >60 ML/MIN/1.73 M^2
GLUCOSE SERPL-MCNC: 109 MG/DL (ref 70–110)
POCT GLUCOSE: 117 MG/DL (ref 70–110)
POTASSIUM SERPL-SCNC: 3.8 MMOL/L (ref 3.5–5.1)
SODIUM SERPL-SCNC: 143 MMOL/L (ref 136–145)

## 2022-02-24 PROCEDURE — 80048 BASIC METABOLIC PNL TOTAL CA: CPT | Performed by: PHYSICIAN ASSISTANT

## 2022-02-24 PROCEDURE — 36415 COLL VENOUS BLD VENIPUNCTURE: CPT | Performed by: PHYSICIAN ASSISTANT

## 2022-02-24 PROCEDURE — 82550 ASSAY OF CK (CPK): CPT | Performed by: PHYSICIAN ASSISTANT

## 2022-02-24 PROCEDURE — G0378 HOSPITAL OBSERVATION PER HR: HCPCS

## 2022-02-24 RX ORDER — METOPROLOL SUCCINATE 25 MG/1
25 TABLET, EXTENDED RELEASE ORAL DAILY
Start: 2022-02-24

## 2022-02-24 NOTE — HOSPITAL COURSE
Placed in observation for acute encephalopathy then PEC in ED.  Unclear use of unknown substance but patient admitted  to home [place] and Seroquel and stated no more use than prescribed. Patient followed by Psychiatrist Patient does have history of schizophrenia however not gravely disable or not harmful to self or others. Patient able to state all medication and reason for medication.  Patient awake alert oriented x4.  Patient stable for discharge home with close follow PCP. Encourage stop smoking.

## 2022-02-24 NOTE — PLAN OF CARE
Problem: Adult Inpatient Plan of Care  Goal: Plan of Care Review  Outcome: Met  Goal: Patient-Specific Goal (Individualized)  Outcome: Met  Goal: Absence of Hospital-Acquired Illness or Injury  Outcome: Met  Goal: Optimal Comfort and Wellbeing  Outcome: Met  Goal: Readiness for Transition of Care  Outcome: Met     Problem: Fall Injury Risk  Goal: Absence of Fall and Fall-Related Injury  Outcome: Met     Problem: Restraint, Nonbehavioral (Nonviolent)  Goal: Absence of Harm or Injury  Outcome: Met     Problem: Restraint, Behavioral (Acute Care)  Goal: Absence of Harm or Injury  Outcome: Met

## 2022-02-24 NOTE — PLAN OF CARE
Pt free from falls, injury or any further trauma throughout shift. Pt Alert and oriented x4. Safety sitter at bedside. Pt PEC. Cardiac monitoring in place. Bed locked in lowest position. Pt in no distress. Will cont to monitor.      Problem: Adult Inpatient Plan of Care  Goal: Plan of Care Review  Outcome: Ongoing, Progressing     Problem: Fall Injury Risk  Goal: Absence of Fall and Fall-Related Injury  Outcome: Ongoing, Progressing     Problem: Restraint, Nonbehavioral (Nonviolent)  Goal: Absence of Harm or Injury  Outcome: Ongoing, Progressing     Problem: Restraint, Behavioral (Acute Care)  Goal: Absence of Harm or Injury  Outcome: Ongoing, Progressing

## 2022-02-24 NOTE — DISCHARGE SUMMARY
[facility] Medicine  Discharge Summary      Patient Name: Moy Escalona  MRN: [id number]  Patient Class: OP- Observation  Admission Date: 2/23/2022  Hospital Length of Stay: 0 days  Discharge Date and Time:  02/24/2022 3:35 PM  Attending Physician: Lauri Dorman MD  Discharging Provider: Aubrie Jeong NP  Primary Care Provider: Son PRASHANT MD Adenike      HPI:   Moy Escalona 66 y.o. male with Htn, HLD, DM, and schizophrenia presents to hospital chief complaint of altered mental status.  He is unsure what brought him to hospital:  Moshe wife activated ambulance and she has a recent issue.  He denies any complaints at this time.  His not remember any events from yesterday or today.  He denies any new medications, but is unable to state the name of his medications at home.  He smokes 1/2 pack per day.  He drinks every 2 weeks.  He denies fever nausea vomiting abdominal pain looks like syncope dizziness dysuria and no hematuria facial droop and slurred speech numbness or Weakness of an extremity.    In the ED, troponin negative, without acute process, TSH within normal limits, lactic acid negative, to have acute process, chest x-ray with nonspecific opacities, BNP negative, .       * No surgery found *      Hospital Course:   Placed in observation for acute encephalopathy then PEC in ED.  Unclear use of unknown substance but patient admitted  to home [place] and Seroquel and stated no more use than prescribed. Patient followed by Psychiatrist Patient does have history of schizophrenia however not gravely disable or not harmful to self or others. Patient able to state all medication and reason for medication.  Patient awake alert oriented x4.  Patient stable for discharge home with close follow PCP. Encourage stop smoking.      PEC discontinued.     Goals of Care Treatment Preferences  Code Status: Full Code      Consults:     No new Assessment & Plan notes have been filed under this hospital service  since the last note was generated.  Service: Hospital Medicine    Final Active Diagnoses:    Diagnosis Date Noted POA    PRINCIPAL PROBLEM:  Encephalopathy, metabolic [G93.41] 02/23/2022 Unknown    Schizophrenia [F20.9] 02/23/2022 Unknown    HTN (hypertension) [I10] 02/23/2022 Unknown    HLD (hyperlipidemia) [E78.5] 02/23/2022 Unknown      Problems Resolved During this Admission:       Discharged Condition: stable    Disposition: Home or Self Care    Follow Up:   Follow-up Information     Son PRASHANT MD Adenike Follow up.    Specialty: Family Medicine  Why: please call to schedule followup appointment with PCP as needed post hospital discharge.  Contact information:  [place] [id number] [phone]                       Patient Instructions:      Diet Cardiac     Diet diabetic     Notify your health care provider if you experience any of the following:  temperature >100.4     Notify your health care provider if you experience any of the following:  difficulty breathing or increased cough     Notify your health care provider if you experience any of the following:  persistent dizziness, light-headedness, or visual disturbances     Notify your health care provider if you experience any of the following:  increased confusion or weakness     Activity as tolerated       Significant Diagnostic Studies: Labs: All labs within the past 24 hours have been reviewed    Pending Diagnostic Studies:     None         Medications:  Reconciled Home Medications:      Medication List      CONTINUE taking these medications    amLODIPine 10 MG tablet  Commonly known as: NORVASC  Take 10 mg by mouth once daily.     bacitracin 500 unit/gram Oint  Apply topically 2 (two) times daily.     FLUoxetine 20 MG capsule  Take 20 mg by mouth once daily.     glipiZIDE 10 MG Tr24  Commonly known as: GLUCOTROL  Take one tablet by mouth daily (FOR DIABETES)     hydroCHLOROthiazide 25 MG tablet  Commonly known as: HYDRODIURIL  Take 25 mg by  mouth once daily.     HYDROcodone-acetaminophen  mg per tablet  Commonly known as: NORCO  TAKE 1 TABLET BY MOUTH EVERY 12 HOURS AS NEEDED FOR PAIN     JANUVIA 100 MG Tab  Generic drug: SITagliptin  Take 100 mg by mouth once daily.     JARDIANCE 10 mg tablet  Generic drug: empagliflozin  Take 10 mg by mouth once daily.     lisinopriL 10 MG tablet  Take 10 mg by mouth.     metFORMIN 750 MG ER 24hr tablet  Commonly known as: GLUCOPHAGE-XR     metoprolol succinate 25 MG 24 hr tablet  Commonly known as: TOPROL-XL  Take 1 tablet (25 mg total) by mouth once daily.     metoprolol tartrate 25 MG tablet  Commonly known as: LOPRESSOR  Take 25 mg by mouth 2 (two) times daily.     quetiapine 200 mg oral tb24 200 MG Tb24  Commonly known as: SEROQUEL XR  Take 200 mg by mouth nightly.     sildenafiL 100 MG tablet  Commonly known as: VIAGRA  Take 1 tablet (100 mg total) by mouth daily as needed for Erectile Dysfunction. Take on empty stomach, 1 hour prior to intercourse. Report to ED for erections greater than 4 hours     simvastatin 20 MG tablet  Commonly known as: ZOCOR  Take 20 mg by mouth nightly.     tiZANidine 4 MG tablet  Commonly known as: ZANAFLEX  Take 4 mg by mouth every 8 (eight) hours.        STOP taking these medications    atorvastatin 20 MG tablet  Commonly known as: LIPITOR            Indwelling Lines/Drains at time of discharge:   Lines/Drains/Airways     None                 Time spent on the discharge of patient: 30 minutes         Aubrie Jeong NP  Department of Hospital Medicine  [facility]

## 2022-02-24 NOTE — PLAN OF CARE
02/24/22 1011   Discharge Planning   Assessment Type Discharge Planning Brief Assessment   Resource/Environmental Concerns none   Support Systems Spouse/significant other   Equipment Currently Used at Home none   Current Living Arrangements home/apartment/condo   Patient/Family Anticipates Transition to home with family   Patient/Family Anticipated Services at Transition none   DME Needed Upon Discharge  none   Discharge Plan A Home with family  (with follow up)     [id number] - [place], [place] - [facility] [address] AT [facility] [address]  [place] [address]  Phone: [phone] Fax: [phone]    [facility] - [place], [place] - [address]  [address]  [facility] [place] [address]  Phone: [phone] Fax: [phone]

## 2022-02-24 NOTE — NURSING
VSS, NAD, Afebrile, voids without difficulty, ambulatory. Discharge instructions explained and handed to pt. Pt verbalizes understanding. IV access dc. Cardiac monitoring dc, monitor tech notified.

## 2022-02-24 NOTE — PROGRESS NOTES
WRITTEN HEALTHCARE DISCHARGE INFORMATION      Things that YOU are RESPONSIBLE for to Manage Your Care At Home:     1. Getting your prescriptions filled.  2. Taking you medications as directed. DO NOT MISS ANY DOSES!  3. Going to your follow-up doctor appointments. This is important because it allows the doctor to monitor your progress and to determine if any changes need to be made to your treatment plan.     If you are unable to make your follow up appointments, please call the number listed and reschedule this appointment.      ____________HELP AT HOME____________________     Experiencing any SIGNS or SYMPTOMS: YOU CAN     Schedule a same day appopintment with your Primary Care Doctor or  you can call [facility] On Call Nurse Care Line for 24/7 assistance at [phone]     If you are experience any signs or symptoms that have become severe, Call 911 and come to your nearest Emergency Room.     Thank you for choosing [facility] and allowing us to care for you.   From your care management team:      You should receive a call from [facility] Discharge Department within 48-72 hours to help manage your care after discharge. Please try to make sure that you answer your phone for this important phone call.     Follow-up Information     Urile Jeong MD Follow up.    Specialty: Family Medicine  Why: please call to schedule followup appointment with PCP as needed post hospital discharge.  Contact information:  [place] [id number] [phone]

## 2022-02-24 NOTE — PLAN OF CARE
02/24/22 1015   Final Note   Assessment Type Final Discharge Note   Anticipated Discharge Disposition Home   Hospital Resources/Appts/Education Provided Provided education on problems/symptoms using teachback;Appointments scheduled and added to AVS   Post-Acute Status   Post-Acute Authorization Other   Other Status No Post-Acute Service Needs   Pts nurse Márquez notified that the pt can d/c from CM standpoint.

## 2022-02-27 LAB
BACTERIA BLD CULT: NORMAL
BACTERIA BLD CULT: NORMAL

## 2022-05-13 ENCOUNTER — TELEPHONE (OUTPATIENT)
Dept: PAIN MEDICINE | Facility: CLINIC | Age: 67
End: 2022-05-13
Payer: MEDICARE

## 2022-05-13 NOTE — TELEPHONE ENCOUNTER
I called the pt to schedule a consult with      ----- Message from Bonnie Keane RN sent at 5/13/2022  8:23 AM CDT -----  Regarding: Schedule consult  Please contact pt to schedule consult- referral in media

## 2022-08-23 ENCOUNTER — HOSPITAL ENCOUNTER (EMERGENCY)
Facility: HOSPITAL | Age: 67
Discharge: HOME | End: 2022-08-23
Attending: EMERGENCY MEDICINE
Payer: MEDICARE

## 2022-08-23 VITALS
RESPIRATION RATE: 17 BRPM | DIASTOLIC BLOOD PRESSURE: 89 MMHG | HEART RATE: 81 BPM | SYSTOLIC BLOOD PRESSURE: 145 MMHG | BODY MASS INDEX: 28.73 KG/M2 | HEIGHT: 69 IN | TEMPERATURE: 98 F | OXYGEN SATURATION: 95 % | WEIGHT: 194 LBS

## 2022-08-23 DIAGNOSIS — R07.9 CHEST PAIN: ICD-10-CM

## 2022-08-23 LAB
ALBUMIN SERPL BCP-MCNC: 4.1 G/DL (ref 3.5–5.2)
ALP SERPL-CCNC: 112 U/L (ref 55–135)
ALT SERPL W/O P-5'-P-CCNC: 38 U/L (ref 10–44)
ANION GAP SERPL CALC-SCNC: 9 MMOL/L (ref 8–16)
AST SERPL-CCNC: 22 U/L (ref 10–40)
BASOPHILS # BLD AUTO: 0.08 K/UL (ref 0–0.2)
BASOPHILS NFR BLD: 0.8 % (ref 0–1.9)
BILIRUB SERPL-MCNC: 0.3 MG/DL (ref 0.1–1)
BNP SERPL-MCNC: <10 PG/ML (ref 0–99)
BUN SERPL-MCNC: 18 MG/DL (ref 8–23)
CALCIUM SERPL-MCNC: 10.2 MG/DL (ref 8.7–10.5)
CHLORIDE SERPL-SCNC: 106 MMOL/L (ref 95–110)
CO2 SERPL-SCNC: 21 MMOL/L (ref 23–29)
CREAT SERPL-MCNC: 1 MG/DL (ref 0.5–1.4)
DIFFERENTIAL METHOD: ABNORMAL
EOSINOPHIL # BLD AUTO: 0.1 K/UL (ref 0–0.5)
EOSINOPHIL NFR BLD: 0.9 % (ref 0–8)
ERYTHROCYTE [DISTWIDTH] IN BLOOD BY AUTOMATED COUNT: 14.5 % (ref 11.5–14.5)
EST. GFR  (NO RACE VARIABLE): >60 ML/MIN/1.73 M^2
GLUCOSE SERPL-MCNC: 200 MG/DL (ref 70–110)
HCT VFR BLD AUTO: 46.1 % (ref 40–54)
HGB BLD-MCNC: 15.6 G/DL (ref 14–18)
IMM GRANULOCYTES # BLD AUTO: 0.05 K/UL (ref 0–0.04)
IMM GRANULOCYTES NFR BLD AUTO: 0.5 % (ref 0–0.5)
LYMPHOCYTES # BLD AUTO: 3.1 K/UL (ref 1–4.8)
LYMPHOCYTES NFR BLD: 30 % (ref 18–48)
MCH RBC QN AUTO: 31 PG (ref 27–31)
MCHC RBC AUTO-ENTMCNC: 33.8 G/DL (ref 32–36)
MCV RBC AUTO: 92 FL (ref 82–98)
MONOCYTES # BLD AUTO: 0.6 K/UL (ref 0.3–1)
MONOCYTES NFR BLD: 6.1 % (ref 4–15)
NEUTROPHILS # BLD AUTO: 6.4 K/UL (ref 1.8–7.7)
NEUTROPHILS NFR BLD: 61.7 % (ref 38–73)
NRBC BLD-RTO: 0 /100 WBC
PLATELET # BLD AUTO: 303 K/UL (ref 150–450)
PMV BLD AUTO: 8.7 FL (ref 9.2–12.9)
POTASSIUM SERPL-SCNC: 4 MMOL/L (ref 3.5–5.1)
PROT SERPL-MCNC: 8.2 G/DL (ref 6–8.4)
RBC # BLD AUTO: 5.04 M/UL (ref 4.6–6.2)
SODIUM SERPL-SCNC: 136 MMOL/L (ref 136–145)
TROPONIN I SERPL DL<=0.01 NG/ML-MCNC: <0.006 NG/ML (ref 0–0.03)
WBC # BLD AUTO: 10.35 K/UL (ref 3.9–12.7)

## 2022-08-23 PROCEDURE — 93005 ELECTROCARDIOGRAM TRACING: CPT

## 2022-08-23 PROCEDURE — 99285 EMERGENCY DEPT VISIT HI MDM: CPT | Mod: 25

## 2022-08-23 PROCEDURE — 84484 ASSAY OF TROPONIN QUANT: CPT | Performed by: STUDENT IN AN ORGANIZED HEALTH CARE EDUCATION/TRAINING PROGRAM

## 2022-08-23 PROCEDURE — 93010 EKG 12-LEAD: ICD-10-PCS | Mod: ,,, | Performed by: INTERNAL MEDICINE

## 2022-08-23 PROCEDURE — 93010 ELECTROCARDIOGRAM REPORT: CPT | Mod: ,,, | Performed by: INTERNAL MEDICINE

## 2022-08-23 PROCEDURE — 83880 ASSAY OF NATRIURETIC PEPTIDE: CPT | Performed by: STUDENT IN AN ORGANIZED HEALTH CARE EDUCATION/TRAINING PROGRAM

## 2022-08-23 PROCEDURE — 80053 COMPREHEN METABOLIC PANEL: CPT | Performed by: STUDENT IN AN ORGANIZED HEALTH CARE EDUCATION/TRAINING PROGRAM

## 2022-08-23 PROCEDURE — 85025 COMPLETE CBC W/AUTO DIFF WBC: CPT | Performed by: STUDENT IN AN ORGANIZED HEALTH CARE EDUCATION/TRAINING PROGRAM

## 2022-08-23 RX ORDER — ASPIRIN 325 MG
325 TABLET ORAL
Status: DISCONTINUED | OUTPATIENT
Start: 2022-08-23 | End: 2022-08-23 | Stop reason: HOSPADM

## 2022-08-23 NOTE — FIRST PROVIDER EVALUATION
"Medical screening exam completed.  I have conducted a focused provider triage encounter, findings are as follows:    Brief history of present illness:  65 yo male with a PMH of arthritis, HTN and HLD who  presents with a 2 day history of substernal chest pain . Patient also endorsing chronic  low back pain. Took Advil, ASA yesterday without relief of his symptoms.     Vitals:    08/23/22 1410   BP: 128/77   Pulse: 98   Resp: 17   Temp: 98.3 °F (36.8 °C)   TempSrc: Oral   SpO2: 95%   Weight: 88 kg (194 lb)   Height: 5' 9" (1.753 m)       Pertinent physical exam:  Non-toxic appearing, no respiratory distress, no focal neurologic deficits, able to ambulate without difficulty or assistance.     Brief workup plan:  IV, labs, chest xray, EKG, and ASA.     Preliminary workup initiated; this workup will be continued and followed by the physician or advanced practice provider that is assigned to the patient when roomed.  "

## 2022-08-23 NOTE — MEDICAL/APP STUDENT
"  History     Chief Complaint   Patient presents with    Chest Pain     67 yo male to triage for center and left sided chest pain w/ radiation to left arm x 2 days. Denies N/V/D, SOB. Pt also complains of lower back pain.     Back Pain     Moy Escalona is a 66 year old male with MHx of DM2, HTN, who presents to ED with 3 day history of bilateral "sticking" chest pain radiating to L arm and through mid back. Pain started when he was laying down, no apparent trigger. Patient took advil, tylenol, and aspirin without relief. States similar chest pain several years ago which was treated with hydrocodone. Denies shortness of breath, palpitations, lightheadedness, LE swelling, N/V/D. Denies history of cardiovascular disease.           Past Medical History:   Diagnosis Date    AMS (altered mental status) 02/23/2022    Diabetes mellitus     High cholesterol     Hyperlipidemia     Hypertension     Paranoid schizophrenia        Past Surgical History:   Procedure Laterality Date    ABDOMINAL SURGERY         Family History   Problem Relation Age of Onset    Cancer Mother        Social History     Tobacco Use    Smoking status: Current Every Day Smoker     Packs/day: 0.50     Types: Cigarettes    Smokeless tobacco: Never Used   Substance Use Topics    Alcohol use: Yes     Comment: occasionally    Drug use: No       Review of Systems   Constitutional: Negative for chills and fever.   Respiratory: Negative for cough, chest tightness, shortness of breath and wheezing.    Cardiovascular: Positive for chest pain. Negative for palpitations and leg swelling.   Gastrointestinal: Negative for abdominal distention, abdominal pain, blood in stool, diarrhea, nausea and vomiting.   Musculoskeletal: Positive for back pain.   Neurological: Negative for light-headedness.       Physical Exam   BP (!) 145/89   Pulse 81   Temp 98.3 °F (36.8 °C) (Oral)   Resp 17   Ht 5' 9" (1.753 m)   Wt 88 kg (194 lb)   SpO2 95%   BMI 28.65 " kg/m²     Physical Exam    Constitutional: He appears well-developed and well-nourished. He is not diaphoretic.  Non-toxic appearance. No distress.   Cardiovascular: Normal rate, regular rhythm, normal heart sounds and normal pulses.   No murmur heard.  Pulmonary/Chest: Effort normal and breath sounds normal. No respiratory distress. He has no wheezes.   Abdominal: Abdomen is soft and flat. He exhibits no distension. There is no abdominal tenderness. There is no rebound and no guarding.     Neurological: He is alert.         ED Course       Imaging:    Imaging Results          X-Ray Chest AP Portable (Final result)  Result time 08/23/22 14:42:42    Final result by Malcolm Anaya MD (08/23/22 14:42:42)                 Impression:      1. No acute cardiopulmonary process appreciated.      Electronically signed by: Malcolm Anaya  Date:    08/23/2022  Time:    14:42             Narrative:    EXAMINATION:  XR CHEST AP PORTABLE    CLINICAL HISTORY:  Chest pain, unspecified    TECHNIQUE:  Single frontal portable view of the chest was performed.    COMPARISON:  Chest radiograph 02/23/2022    FINDINGS:  Cardiomediastinal silhouette is within normal limits.    No focal consolidation, overt interstitial edema, sizable pleural effusion   or pneumothorax.    Multilevel degenerative changes of the imaged spine.                               Initial Assessment:    Moy Escalona is a 66 year old male who presents with 3 days of bilateral chest pain radiating to L arm and through to mid back. Hypertensive but vitals otherwise normal. Non-toxic, no apparent distress.         Differential diagnosis: ACS    Differential diagnosis includes but is not limited to: pulmonary embolism, costochondritis, pneumonia, pleuritis      ED Management:  CXR, ECG, troponin, CBC, CMP          Disposition:  observe               Clinical Impression:

## 2022-08-23 NOTE — ED NOTES
"Pt refused second troponin. States " Im ready to go. im not waiting here"     Provider notified. ]      Pt signed AMA form   "

## 2022-08-23 NOTE — ED NOTES
"Pt sitting up in bed, respirations even/unlabored. NAD Noted. Pt requesting [place] meds, specially hydrocodone. Explained to patient I can give him his Asprin. Pt states " Asprin doesn't work. Last time this happened they gave me a hydrocodone and it took the pain completely away"-- provider notified   "

## 2022-08-24 NOTE — ED PROVIDER NOTES
Encounter Date: 8/23/2022       History     Chief Complaint   Patient presents with    Chest Pain     65 yo male to triage for center and left sided chest pain w/ radiation to left arm x 2 days. Denies N/V/D, SOB. Pt also complains of lower back pain.     Back Pain     66-year-old male with a history of diabetes, hyperlipidemia, hypertension presenting with midsternal chest pain that radiates to left side.  Reports symptoms ongoing for 2 days.  Denies shortness of breath, nausea, vomiting, diaphoresis.  Reports history of similar that hydrocodone works to improved.  Denies abdominal pain, lower extremity edema, cough, hemoptysis, history DVT or PE.  Previously in usual state of health.        Review of patient's allergies indicates:  No Known Allergies  Past Medical History:   Diagnosis Date    AMS (altered mental status) 02/23/2022    Diabetes mellitus     High cholesterol     Hyperlipidemia     Hypertension     Paranoid schizophrenia      Past Surgical History:   Procedure Laterality Date    ABDOMINAL SURGERY       Family History   Problem Relation Age of Onset    Cancer Mother      Social History     Tobacco Use    Smoking status: Current Every Day Smoker     Packs/day: 0.50     Types: Cigarettes    Smokeless tobacco: Never Used   Substance Use Topics    Alcohol use: Yes     Comment: occasionally    Drug use: No     Review of Systems   Constitutional: Negative for chills and fever.   HENT: Negative for sore throat.    Respiratory: Negative for shortness of breath.    Cardiovascular: Positive for chest pain.   Gastrointestinal: Negative for abdominal pain, nausea and vomiting.   Genitourinary: Negative for dysuria.   Musculoskeletal: Negative for back pain.   Skin: Negative for rash.   Neurological: Negative for weakness.   Psychiatric/Behavioral: Negative for confusion.       Physical Exam     Initial Vitals [08/23/22 1410]   BP Pulse Resp Temp SpO2   128/77 98 17 98.3 °F (36.8 °C) 95 %      MAP        --         Physical Exam    Constitutional: He appears well-developed and well-nourished. He is not diaphoretic. No distress.   HENT:   Head: Normocephalic and atraumatic.   Eyes: Conjunctivae and EOM are normal. Pupils are equal, round, and reactive to light. No scleral icterus.   Neck: Neck supple.   Normal range of motion.  Cardiovascular: Normal rate, regular rhythm, normal heart sounds and intact distal pulses. Exam reveals no gallop and no friction rub.    No murmur heard.  Pulmonary/Chest: Breath sounds normal. No stridor. No respiratory distress. He has no wheezes. He has no rhonchi. He has no rales.   Abdominal: Abdomen is soft. Bowel sounds are normal. He exhibits no distension. There is no abdominal tenderness. There is no rebound and no guarding.   Musculoskeletal:         General: No tenderness or edema. Normal range of motion.      Cervical back: Normal range of motion and neck supple.     Neurological: He is alert and oriented to person, place, and time. He has normal strength. No cranial nerve deficit. GCS score is 15. GCS eye subscore is 4. GCS verbal subscore is 5. GCS motor subscore is 6.   Skin: Skin is warm and dry. No rash noted.   Psychiatric: He has a normal mood and affect. His behavior is normal.         ED Course   Procedures  Labs Reviewed   CBC W/ AUTO DIFFERENTIAL - Abnormal; Notable for the following components:       Result Value    MPV 8.7 (*)     Immature Grans (Abs) 0.05 (*)     All other components within normal limits   COMPREHENSIVE METABOLIC PANEL - Abnormal; Notable for the following components:    CO2 21 (*)     Glucose 200 (*)     All other components within normal limits   TROPONIN I   B-TYPE NATRIURETIC PEPTIDE     EKG Readings: (Independently Interpreted)   Initial Reading: No STEMI. Rhythm: Normal Sinus Rhythm. Heart Rate: 96. Conduction: RBBB.   No ST segment elevation or depression concerning for acute ischemia.          Imaging Results          X-Ray Chest AP  Portable (Final result)  Result time 08/23/22 14:42:42    Final result by Malcolm Anaya MD (08/23/22 14:42:42)                 Impression:      1. No acute cardiopulmonary process appreciated.      Electronically signed by: Malcolm Anaya  Date:    08/23/2022  Time:    14:42             Narrative:    EXAMINATION:  XR CHEST AP PORTABLE    CLINICAL HISTORY:  Chest pain, unspecified    TECHNIQUE:  Single frontal portable view of the chest was performed.    COMPARISON:  Chest radiograph 02/23/2022    FINDINGS:  Cardiomediastinal silhouette is within normal limits.    No focal consolidation, overt interstitial edema, sizable pleural effusion or pneumothorax.    Multilevel degenerative changes of the imaged spine.                                 Medications - No data to display  Medical Decision Making:   Initial Assessment:   66 year old patient with hx of HLD, DM, HTN presenting secondary to chest pain. Patient is at higher risk of ACS due to risk factors and HPI with a heart score of 5+. Patient has received an aspirin. Troponins, Cxr, ekg, and labs ordered which showed no acute findings.    [email or web address]    Also considered but less likely:     PE: normal rate, no sob/recent immovilization/surgery/travel/family history  Pneumonia: chest xray negative. No fever or leukoscytosis. No cough and lungs non consistent with pna  Tamponade: unlikely due to chest xray and ekg  STEMI: No STEMI on ekg  Dissection: equal pulses bilaterally and no ripping chest pain to the back  Esophageal rupture: no dysphagia or vomiting and chest xray negative for mediastinal air  Arrhythmia: no arrhythmia on ekg  CHF: no fluid overload on Cxr and physical exam  Pneumothorax: bilateral breath sounds and no signs of pneumothorax on chest xray      Patient stating pain is intense.  He is requesting something for pain.  I have offered him nitroglycerin an effort to relieve pain.  Patient then states he does  not want medication for pain.  Reports the only thing that makes his pain better is hydrocodone.  Patient is also refusing aspirin.  I discussed appropriate treatment of chest pain to include nitroglycerin as both therapeutic as well as diagnostic.  The patient refuses all medications.  Patient states I do not want to be here any longer.  Patient is refusing a 2nd troponin, observation for evaluation of chest pain.  I have discussed the patient's chest pain is high risk.  Patient states he understands but he just wants to go home.  Patient has left against medical advice.  I have no reason to suspect the patient does not have capability to understand the consequences of his actions. He did not appear intoxicated, deranged, or altered. Patient encouraged to return for any new or worsening condition.      Additional MDM:   Heart Score:    History:          Moderately suspicious.  ECG:             Normal  Age:               >65 years  Risk factors: >= 3 risk factors or history of atherosclerotic disease  Troponin:       Less than or equal to normal limit  Final Score: 5                       Clinical Impression:   Final diagnoses:  [R07.9] Chest pain          ED Disposition Condition    AMA               Erasto Cordova MD  08/23/22 [id number]

## 2023-09-03 ENCOUNTER — HOSPITAL ENCOUNTER (EMERGENCY)
Facility: HOSPITAL | Age: 68
Discharge: HOME | End: 2023-09-03
Attending: EMERGENCY MEDICINE
Payer: MEDICARE

## 2023-09-03 VITALS
HEART RATE: 108 BPM | RESPIRATION RATE: 18 BRPM | TEMPERATURE: 99 F | HEIGHT: 69 IN | OXYGEN SATURATION: 96 % | WEIGHT: 200 LBS | SYSTOLIC BLOOD PRESSURE: 153 MMHG | BODY MASS INDEX: 29.62 KG/M2 | DIASTOLIC BLOOD PRESSURE: 79 MMHG

## 2023-09-03 DIAGNOSIS — K04.7 DENTAL ABSCESS: Primary | ICD-10-CM

## 2023-09-03 DIAGNOSIS — K02.9 DENTAL CARIES: ICD-10-CM

## 2023-09-03 PROCEDURE — 99284 EMERGENCY DEPT VISIT MOD MDM: CPT

## 2023-09-03 RX ORDER — IBUPROFEN 800 MG/1
800 TABLET ORAL EVERY 8 HOURS PRN
Qty: 30 TABLET | Refills: 0 | Status: SHIPPED | OUTPATIENT
Start: 2023-09-03

## 2023-09-03 RX ORDER — PENICILLIN V POTASSIUM 500 MG/1
500 TABLET, FILM COATED ORAL 4 TIMES DAILY
Qty: 40 TABLET | Refills: 0 | Status: SHIPPED | OUTPATIENT
Start: 2023-09-03 | End: 2023-09-07 | Stop reason: SDUPTHER

## 2023-09-03 NOTE — ED PROVIDER NOTES
"Encounter Date: 9/3/2023       History     Chief Complaint   Patient presents with    Dental Pain     Pt to ER with reports of left lower tooth pain x 3 days.      67-year-old male presents with tooth pain and swelling to gumline and x2 days.  He states "it is so loose it just needs to get pulled out" patient denies fever difficulty swallowing or any other associated symptoms      Review of patient's allergies indicates:  No Known Allergies  Past Medical History:   Diagnosis Date    AMS (altered mental status) 02/23/2022    Diabetes mellitus     High cholesterol     Hyperlipidemia     Hypertension     Paranoid schizophrenia      Past Surgical History:   Procedure Laterality Date    ABDOMINAL SURGERY       Family History   Problem Relation Age of Onset    Cancer Mother      Social History     Tobacco Use    Smoking status: Every Day     Current packs/day: 0.50     Types: Cigarettes    Smokeless tobacco: Never   Substance Use Topics    Alcohol use: Yes     Comment: occasionally    Drug use: No     Review of Systems   Constitutional:  Negative for fever.   HENT:  Positive for dental problem. Negative for sore throat.    Respiratory:  Negative for shortness of breath.    Cardiovascular:  Negative for chest pain.   Gastrointestinal:  Negative for nausea.   Genitourinary:  Negative for dysuria.   Musculoskeletal:  Negative for back pain.   Skin:  Negative for rash.   Neurological:  Negative for weakness.   Hematological:  Does not bruise/bleed easily.       Physical Exam     Initial Vitals [09/03/23 1126]   BP Pulse Resp Temp SpO2   (!) 153/79 108 18 99 °F (37.2 °C) 96 %      MAP       --         Physical Exam    Nursing note and vitals reviewed.  Constitutional: He appears well-developed and well-nourished.   HENT:   Head: Normocephalic.   Mouth/Throat: Oropharynx is clear and moist and mucous membranes are normal. Dental caries present.       Eyes: Conjunctivae are normal.   Neck: Neck supple.   Normal range of " motion.  Musculoskeletal:         General: Normal range of motion.      Cervical back: Normal range of motion and neck supple.     Neurological: He is alert and oriented to person, place, and time. GCS score is 15. GCS eye subscore is 4. GCS verbal subscore is 5. GCS motor subscore is 6.   Skin: Skin is warm and dry. Capillary refill takes less than 2 seconds.   Psychiatric: He has a normal mood and affect.         ED Course   Procedures  Labs Reviewed - No data to display       Imaging Results    None          Medications - No data to display  Medical Decision Making  Diagnosis management comments: This is an urgent evaluation of a 67-year-old male that presented to the ER with c/o tooth pain. Pts exam was as above.     I will discharge patient with pcn VK and motrin.  Patient given 15 tabs of hydrocodone on August 31st.  Patient states that    Based on exam today - I have low suspicion for medical, surgical or other life threatening condition and I believe pt is safe for discharge and outpatient f/u.    Pt verbalizes understanding of d/c instructions and will return for worsening condition.          Risk  Prescription drug management.                               Clinical Impression:   Final diagnoses:  [K04.7] Dental abscess (Primary)  [K02.9] Dental caries        ED Disposition Condition    Discharge Stable          ED Prescriptions       Medication Sig Dispense Start Date End Date Auth. Provider    penicillin v potassium (VEETID) 500 MG tablet Take 1 tablet (500 mg total) by mouth 4 (four) times daily. for 10 days 40 tablet 9/3/2023 9/13/2023 Kimberly Foley NP    ibuprofen (ADVIL,MOTRIN) 800 MG tablet Take 1 tablet (800 mg total) by mouth every 8 (eight) hours as needed for Pain. 30 tablet 9/3/2023 -- Kimberly Foley NP          Follow-up Information       Follow up With Specialties Details Why Contact Info    Uriel Jeong MD Family Medicine Schedule an appointment as soon as possible for a visit    [address] [facility] [address]  [phone]      DENTIST  Schedule an appointment as soon as possible for a visit   [facility] Emergency Dept Emergency Medicine  If symptoms worsen or any other concerns [address]  [facility] [id number] [phone]             Kimberly Foley, TITO  09/03/23 1206       Kimberly Foley NP  09/03/23 6627

## 2023-09-07 ENCOUNTER — TELEPHONE (OUTPATIENT)
Dept: EMERGENCY MEDICINE | Facility: HOSPITAL | Age: 68
End: 2023-09-07
Payer: MEDICARE

## 2023-09-07 RX ORDER — PENICILLIN V POTASSIUM 500 MG/1
500 TABLET, FILM COATED ORAL 4 TIMES DAILY
Qty: 40 TABLET | Refills: 0 | Status: SHIPPED | OUTPATIENT
Start: 2023-09-07 | End: 2023-09-17

## 2023-12-19 ENCOUNTER — HOSPITAL ENCOUNTER (EMERGENCY)
Facility: HOSPITAL | Age: 68
Discharge: HOME | End: 2023-12-19
Attending: EMERGENCY MEDICINE
Payer: MEDICARE

## 2023-12-19 VITALS
HEIGHT: 69 IN | TEMPERATURE: 98 F | SYSTOLIC BLOOD PRESSURE: 154 MMHG | WEIGHT: 192 LBS | RESPIRATION RATE: 16 BRPM | OXYGEN SATURATION: 98 % | BODY MASS INDEX: 28.44 KG/M2 | DIASTOLIC BLOOD PRESSURE: 80 MMHG | HEART RATE: 97 BPM
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
## 2023-12-19 DIAGNOSIS — R60.0 UNILATERAL EDEMA OF LOWER EXTREMITY: ICD-10-CM

## 2023-12-19 DIAGNOSIS — S82.862A CLOSED DISPLACED MAISONNEUVE FRACTURE OF LEFT LOWER EXTREMITY, INITIAL ENCOUNTER: Primary | ICD-10-CM

## 2023-12-19 LAB — POCT GLUCOSE: 214 MG/DL (ref 70–110)

## 2023-12-19 PROCEDURE — 82962 GLUCOSE BLOOD TEST: CPT

## 2023-12-19 PROCEDURE — 29515 APPLICATION SHORT LEG SPLINT: CPT | Mod: LT

## 2023-12-19 PROCEDURE — 99284 EMERGENCY DEPT VISIT MOD MDM: CPT | Mod: 25

## 2023-12-19 RX ORDER — NAPROXEN 500 MG/1
500 TABLET ORAL 2 TIMES DAILY
Qty: 30 TABLET | Refills: 0 | Status: SHIPPED | OUTPATIENT
Start: 2023-12-19

## 2023-12-19 RX ORDER — HYDROCODONE BITARTRATE AND ACETAMINOPHEN 5; 325 MG/1; MG/1
1 TABLET ORAL EVERY 6 HOURS PRN
Qty: 12 TABLET | Refills: 0 | Status: SHIPPED | OUTPATIENT
Start: 2023-12-19

## 2023-12-19 RX ORDER — METHOCARBAMOL 500 MG/1
1000 TABLET, FILM COATED ORAL 3 TIMES DAILY
Qty: 30 TABLET | Refills: 0 | Status: SHIPPED | OUTPATIENT
Start: 2023-12-19 | End: 2023-12-24

## 2023-12-19 NOTE — DISCHARGE INSTRUCTIONS
Thank you for coming to our Emergency Department today. It is important to remember that some problems or medical conditions are difficult to diagnose and may not be found or addressed during your Emergency Department visit.  These conditions often start with non-specific symptoms and can only be diagnosed on follow up visits with your primary care physician or specialist when the symptoms continue or change. Please remember that all medical conditions can change, and we cannot predict how you will be feeling tomorrow or the next day. Return to the ER with any questions/concerns, new/concerning symptoms, worsening or failure to improve.   Be sure to follow up with your primary care doctor and review all labs/imaging/tests that were performed during your ER visit with them. It is very common for us to identify non-emergent incidental findings which must be followed up with your primary care physician.  Some labs/imaging/tests may be outside of the normal range, and require non-emergent follow-up and/or further investigation/treatment/procedures/testing to help diagnose/exclude/prevent complications or other potentially serious medical conditions. Some abnormalities may not have been discussed or addressed during your ER visit. Some lab results may not return during your ER visit but can be accessible by downloading the free [facility] Mychart mayda or by visiting [email or web address] . It is important for you to review all labs/imaging/tests which are outside of the normal range with your physician.  An ER visit does not replace a primary care visit, and many screening tests or follow-up tests cannot be ordered by an ER doctor or performed by the ER. Some tests may even require pre-approval.  If you do not have a primary care doctor, you may contact the one listed on your discharge paperwork or you may also call the [facility] Appointment Desk at [phone] , or [address] [place] [address] at  [phone] to schedule an  appointment, or establish care with a primary care doctor or even a specialist and to obtain information about local resources. It is important to your health that you have a primary care doctor.  Please take all medications as directed. We have done our best to select a medication for you that will treat your condition however, all medications may potentially have side-effects and it is impossible to predict which medications may give you side-effects or what those side-effects (if any) those medications may give you.  If you feel that you are having a negative effect or side-effect of any medication you should stop taking those medications immediately and seek medical attention. If you feel that you are having a life-threatening reaction call 911.  Do not drive, swim, climb to height, take a bath, operate heavy machinery, drink alcohol or take potentially sedating medications, sign any legal documents or make any important decisions for 24 hours if you have received any pain medications, sedatives or mood altering drugs during your ER visit or within 24 hours of taking them if they have been prescribed to you.   You can find additional resources for Dentists, hearing aids, durable medical equipment, low cost pharmacies and other resources at [email or web address]  Patient agrees with this plan. Discussed with her strict return precautions, she verbalized understanding. Patient is stable for discharge.

## 2023-12-19 NOTE — ED PROVIDER NOTES
Encounter Date: 12/19/2023       History     Chief Complaint   Patient presents with    Ankle Pain     Pt to ER with reports of left ankle pain s/p twisting it 4 days ago. Pt reports hurts to walk on.      HPI    68-year-old male with a past medical history of diabetes, hypertension, hyperlipidemia presenting to the emergency department today complaining of left ankle pain x4 days.  Patient states he twisted his ankle accidentally without falling when days ago and since then has been having pain.  Patient states when he twisted his ankle ear heard a snap.  Patient states he has not taken anything for his symptoms.  Patient denies any fever, chest pain, shortness for breath, N/V/D, headache, abdominal pain, weakness, dizziness.    Review of patient's allergies indicates:  No Known Allergies  Past Medical History:   Diagnosis Date    AMS (altered mental status) 02/23/2022    Diabetes mellitus     High cholesterol     Hyperlipidemia     Hypertension     Paranoid schizophrenia      Past Surgical History:   Procedure Laterality Date    ABDOMINAL SURGERY       Family History   Problem Relation Age of Onset    Cancer Mother      Social History     Tobacco Use    Smoking status: Every Day     Current packs/day: 0.50     Types: Cigarettes    Smokeless tobacco: Never   Substance Use Topics    Alcohol use: Yes     Comment: occasionally    Drug use: No     Review of Systems   Constitutional:  Negative for chills, diaphoresis, fatigue and fever.   HENT:  Negative for congestion, ear discharge, ear pain, rhinorrhea, sore throat and trouble swallowing.    Eyes:  Negative for photophobia, redness and visual disturbance.   Respiratory:  Negative for cough and shortness of breath.    Cardiovascular:  Negative for chest pain, palpitations and leg swelling.   Gastrointestinal:  Negative for abdominal pain, diarrhea, nausea and vomiting.   Genitourinary:  Negative for difficulty urinating, dysuria, flank pain, frequency and hematuria.    Musculoskeletal:  Positive for arthralgias (Left ankle) and joint swelling (Left ankle). Negative for back pain, gait problem, myalgias, neck pain and neck stiffness.   Skin:  Negative for pallor and rash.   Neurological:  Negative for dizziness, weakness, light-headedness, numbness and headaches.   Hematological:  Does not bruise/bleed easily.       Physical Exam     Initial Vitals   BP Pulse Resp Temp SpO2   12/19/23 1331 12/19/23 1331 12/19/23 1331 12/19/23 1331 12/19/23 1537   128/79 (!) 125 20 97.5 °F (36.4 °C) 98 %      MAP       --                Physical Exam    Nursing note and vitals reviewed.  Constitutional: He appears well-developed and well-nourished. He is not diaphoretic. He does not appear ill. No distress.   HENT:   Head: Normocephalic and atraumatic. Head is without raccoon's eyes and without Sewell's sign.   Right Ear: External ear normal.   Left Ear: External ear normal.   Nose: Nose normal.   Mouth/Throat: Uvula is midline and oropharynx is clear and moist.   Eyes: Conjunctivae and EOM are normal. Pupils are equal, round, and reactive to light. Right eye exhibits no discharge. Left eye exhibits no discharge. No scleral icterus.   Neck: Trachea normal.   Normal range of motion.   Full passive range of motion without pain.     Cardiovascular:  Normal rate, regular rhythm, normal heart sounds, intact distal pulses and normal pulses.           Pulmonary/Chest: Effort normal and breath sounds normal. No respiratory distress.   Abdominal: Abdomen is soft. Bowel sounds are normal. He exhibits no distension and no pulsatile midline mass. There is no abdominal tenderness.   No right CVA tenderness.  No left CVA tenderness. There is no rebound and no guarding.   Musculoskeletal:      Right shoulder: Normal.      Left shoulder: Normal.      Right elbow: Normal.      Left elbow: Normal.      Right wrist: Normal.      Left wrist: Normal.      Right hand: Normal.      Left hand: Normal.      Cervical back:  Normal, full passive range of motion without pain and normal range of motion.      Thoracic back: Normal.      Lumbar back: Normal.      Right hip: Normal.      Left hip: Normal.      Right knee: Normal.      Left knee: Normal.      Right lower leg: Normal.      Left lower leg: Swelling and tenderness present. No deformity, lacerations or bony tenderness. 1+ Pitting Edema present.      Right ankle: Normal.      Left ankle: Swelling present. No deformity, ecchymosis or lacerations. No tenderness. Decreased range of motion (Confounded by pain). Anterior drawer test negative. Normal pulse.      Left Achilles Tendon: Normal.      Right foot: Normal.      Left foot: Normal.      Comments: Swelling and 1+ pitting edema to left distal lower extremity consistent with left ankle swelling.  Pain with range of motion of left ankle.  2+ dorsal pedal pulses intact bilaterally.  Normal neurovascular exam.  Normal sensation.  No pallor.     Neurological: He is alert and oriented to person, place, and time. He has normal strength. No cranial nerve deficit or sensory deficit. Coordination and gait normal.   Skin: Skin is warm and dry. Capillary refill takes less than 2 seconds. No bruising, no ecchymosis and no rash noted. No erythema.   Psychiatric: He has a normal mood and affect. His speech is normal and behavior is normal. Thought content normal.         ED Course   Splint Application    Date/Time: 12/19/2023 5:18 PM    Performed by: Cliff Churchill PA-C  Authorized by: Tate Hobson MD  Consent Done: Yes  Consent: Verbal consent obtained.  Consent given by: patient  Patient understanding: patient states understanding of the procedure being performed  Patient identity confirmed: MRN and verbally with patient  Splint type: short leg  Supplies used: Ortho-Glass, cotton padding and elastic bandage  Post-procedure: The splinted body part was neurovascularly unchanged following the procedure.  Patient tolerance: Patient tolerated  the procedure well with no immediate complications        Labs Reviewed   POCT GLUCOSE - Abnormal; Notable for the following components:       Result Value    POCT Glucose 214 (*)     All other components within normal limits          Imaging Results              US Lower Extremity Veins Left (Final result)  Result time 12/19/23 17:42:07      Final result by Robert Xiao MD (12/19/23 17:42:07)                   Impression:      No evidence of deep venous thrombosis in the left lower extremity.      Electronically signed by: Robert Xiao MD  Date:    12/19/2023  Time:    17:42               Narrative:    EXAMINATION:  US LOWER EXTREMITY VEINS LEFT    CLINICAL HISTORY:  Localized edema    TECHNIQUE:  Duplex and color flow Doppler evaluation and graded compression of the left lower extremity veins was performed.    COMPARISON:  None    FINDINGS:  Left thigh veins: The common femoral, femoral, popliteal, upper greater saphenous, and deep femoral veins are patent and free of thrombus. The veins are normally compressible and have normal phasic flow and augmentation response.    Left calf veins: The visualized calf veins are patent.    Contralateral CFV: The contralateral (right) common femoral vein is patent and free of thrombus.    Miscellaneous: There is edema within the left ankle.                                       X-Ray Tibia Fibula 2 View Left (Final result)  Result time 12/19/23 15:57:03      Final result by Robert Xiao MD (12/19/23 15:57:03)                   Impression:      Acute displaced fracture of the medial malleolus along with displaced fractures of the proximal fibula, suggestive of Maisonneuve type fracture.    Acute nondisplaced fracture the posterior malleolus.    Widening of the medial clear space, suggestive of ankle instability.  No evidence of a dislocation.      Electronically signed by: Robert Xiao MD  Date:    12/19/2023  Time:    15:57               Narrative:    EXAMINATION:  XR TIBIA  FIBULA 2 VIEW LEFT; XR ANKLE COMPLETE 3 VIEW LEFT; XR FOOT COMPLETE 3 VIEW LEFT    CLINICAL HISTORY:  Unspecified fall, initial encounter    TECHNIQUE:  AP and lateral views of the left tibia and fibula were performed.    Three x-ray views of the left ankle and foot.    COMPARISON:  None.    FINDINGS:  The bone mineralization is within normal limits.  There is an acute comminuted and displaced fracture of the proximal fibula metadiaphysis.    There is an acute displaced fracture of the medial malleolus with widening of the medial clear space.  There is also a small nondisplaced fracture of the posterior malleolus.    The remainder of the joint spaces demonstrate joint space narrowing with minimal osteophytosis.  The suspected instability at level of the ankle.  There is no evidence of a dislocation.    There is soft tissue swelling about the left ankle.  Vascular calcifications are present no radiopaque foreign body is identified.                                       X-Ray Ankle Complete Left (Final result)  Result time 12/19/23 15:57:03      Final result by Robert Xiao MD (12/19/23 15:57:03)                   Impression:      Acute displaced fracture of the medial malleolus along with displaced fractures of the proximal fibula, suggestive of Maisonneuve type fracture.    Acute nondisplaced fracture the posterior malleolus.    Widening of the medial clear space, suggestive of ankle instability.  No evidence of a dislocation.      Electronically signed by: Robert Xiao MD  Date:    12/19/2023  Time:    15:57               Narrative:    EXAMINATION:  XR TIBIA FIBULA 2 VIEW LEFT; XR ANKLE COMPLETE 3 VIEW LEFT; XR FOOT COMPLETE 3 VIEW LEFT    CLINICAL HISTORY:  Unspecified fall, initial encounter    TECHNIQUE:  AP and lateral views of the left tibia and fibula were performed.    Three x-ray views of the left ankle and foot.    COMPARISON:  None.    FINDINGS:  The bone mineralization is within normal limits.  There is  an acute comminuted and displaced fracture of the proximal fibula metadiaphysis.    There is an acute displaced fracture of the medial malleolus with widening of the medial clear space.  There is also a small nondisplaced fracture of the posterior malleolus.    The remainder of the joint spaces demonstrate joint space narrowing with minimal osteophytosis.  The suspected instability at level of the ankle.  There is no evidence of a dislocation.    There is soft tissue swelling about the left ankle.  Vascular calcifications are present no radiopaque foreign body is identified.                                       X-Ray Foot Complete Left (Final result)  Result time 12/19/23 15:57:03      Final result by Robert Xiao MD (12/19/23 15:57:03)                   Impression:      Acute displaced fracture of the medial malleolus along with displaced fractures of the proximal fibula, suggestive of Maisonneuve type fracture.    Acute nondisplaced fracture the posterior malleolus.    Widening of the medial clear space, suggestive of ankle instability.  No evidence of a dislocation.      Electronically signed by: Robert Xiao MD  Date:    12/19/2023  Time:    15:57               Narrative:    EXAMINATION:  XR TIBIA FIBULA 2 VIEW LEFT; XR ANKLE COMPLETE 3 VIEW LEFT; XR FOOT COMPLETE 3 VIEW LEFT    CLINICAL HISTORY:  Unspecified fall, initial encounter    TECHNIQUE:  AP and lateral views of the left tibia and fibula were performed.    Three x-ray views of the left ankle and foot.    COMPARISON:  None.    FINDINGS:  The bone mineralization is within normal limits.  There is an acute comminuted and displaced fracture of the proximal fibula metadiaphysis.    There is an acute displaced fracture of the medial malleolus with widening of the medial clear space.  There is also a small nondisplaced fracture of the posterior malleolus.    The remainder of the joint spaces demonstrate joint space narrowing with minimal osteophytosis.  The  "suspected instability at level of the ankle.  There is no evidence of a dislocation.    There is soft tissue swelling about the left ankle.  Vascular calcifications are present no radiopaque foreign body is identified.                                       Medications - No data to display  Medical Decision Making  68-year-old male with a past medical history of diabetes, hypertension, hyperlipidemia presenting to the emergency department today complaining of left ankle pain x4 days.  Patient states he twisted his ankle accidentally without falling when days ago and since then has been having pain.  Patient states when he twisted his ankle ear heard a "snap".  Patient states he has not taken anything for his symptoms.  Patient denies any fever, chest pain, shortness for breath, N/V/D, headache, abdominal pain, weakness, dizziness.  Patient's chart and medical history reviewed.  Patient's vitals reviewed.  They are afebrile, no respiratory distress, nontoxic-appearing in the ED.  Differential diagnosis is septic arthritis, gout, osteomyelitis, fracture, dislocation, sprain, strain, compartment syndrome, DVT.  Consulted Orthopedics and discussed case with Dr. Geiger who stated the place patient in a posterior leg splint, provide crutches, and have him follow up outpatient in his clinic.  Physical exam findings and imaging results discussed with the patient and plan is made to have him follow up with a plan discussed above.  Patient agrees with the plan does not have any questions for me at this time.  Patient will be discharged home with NSAIDs and muscle relaxers.  Patient is currently hemodynamically stable, afebrile.  I discussed with the patient/family the diagnosis, treatment plan, indications for return to the emergency department, and for expected follow-up. The patient/family verbalized an understanding. The patient/family is asked if there are any questions or concerns. We discuss the case, until all issues " are addressed to the patient/family's satisfaction. Patient/family understands and is agreeable to the plan.   CLIFF CÁRDENAS    DISCLAIMER: This note was prepared with [employer] voice recognition transcription software. Garbled syntax, mangled pronouns, and other bizarre constructions may be attributed to that software system.      Amount and/or Complexity of Data Reviewed  External Data Reviewed: notes.  Radiology: ordered.    Risk  Prescription drug management.  Diagnosis or treatment significantly limited by social determinants of health.                                      Clinical Impression:  Final diagnoses:  [W19.XXXA] Fall  [R60.0] Unilateral edema of lower extremity  [S82.862A] Closed displaced Maisonneuve fracture of left lower extremity, initial encounter (Primary)          ED Disposition Condition    Discharge Stable          ED Prescriptions       Medication Sig Dispense Start Date End Date Auth. Provider    naproxen (NAPROSYN) 500 MG tablet Take 1 tablet (500 mg total) by mouth 2 (two) times daily. 30 tablet 12/19/2023 -- Cliff Cárdenas PA-C    methocarbamoL (ROBAXIN) 500 MG Tab Take 2 tablets (1,000 mg total) by mouth 3 (three) times daily. for 5 days 30 tablet 12/19/2023 12/24/2023 Cliff Cárdenas PA-C    HYDROcodone-acetaminophen (NORCO) 5-325 mg per tablet Take 1 tablet by mouth every 6 (six) hours as needed for Pain. 12 tablet 12/19/2023 -- Cliff Cárdenas PA-C          Follow-up Information       Follow up With Specialties Details Why Contact Info    Uriel Jeong MD Family Medicine Schedule an appointment as soon as possible for a visit in 3 days for follow up [address] [facility] [address]  [phone]      Naeem Boyle Jr., MD Hand Surgery, Orthopedic Surgery  for orthopedic follow up, If symptoms worsen [address]  [address]  [facility] [id number] [phone]      Russell Luna MD Orthopedic Surgery Schedule an appointment as soon as possible for a visit  If symptoms  [address]  [address]  [facility] [address]  [phone]               Cliff Churchill PA-C  12/19/23 1912

## 2023-12-21 ENCOUNTER — TELEPHONE (OUTPATIENT)
Dept: ORTHOPEDICS | Facility: CLINIC | Age: 68
End: 2023-12-21
Payer: MEDICARE

## 2023-12-21 NOTE — TELEPHONE ENCOUNTER
Spoken with patient to let him know that the clinic here is unable to see him due to his insurance. Gave the patient the number for the new medicaid patient ( [phone] ) to call so that they can assist him.patient understand what was told to him

## 2023-12-28 ENCOUNTER — HOSPITAL ENCOUNTER (EMERGENCY)
Facility: HOSPITAL | Age: 68
Discharge: HOME | End: 2023-12-28
Attending: STUDENT IN AN ORGANIZED HEALTH CARE EDUCATION/TRAINING PROGRAM
Payer: MEDICARE

## 2023-12-28 VITALS
WEIGHT: 192 LBS | DIASTOLIC BLOOD PRESSURE: 83 MMHG | BODY MASS INDEX: 28.44 KG/M2 | HEIGHT: 69 IN | TEMPERATURE: 98 F | OXYGEN SATURATION: 98 % | HEART RATE: 120 BPM | SYSTOLIC BLOOD PRESSURE: 134 MMHG | RESPIRATION RATE: 18 BRPM

## 2023-12-28 DIAGNOSIS — S82.899A ANKLE FRACTURE: ICD-10-CM

## 2023-12-28 DIAGNOSIS — S82.865G: Primary | ICD-10-CM

## 2023-12-28 LAB — POCT GLUCOSE: 188 MG/DL (ref 70–110)

## 2023-12-28 PROCEDURE — 25000003 PHARM REV CODE 250: Performed by: STUDENT IN AN ORGANIZED HEALTH CARE EDUCATION/TRAINING PROGRAM

## 2023-12-28 PROCEDURE — 82962 GLUCOSE BLOOD TEST: CPT

## 2023-12-28 PROCEDURE — 99283 EMERGENCY DEPT VISIT LOW MDM: CPT | Mod: 25

## 2023-12-28 PROCEDURE — 29515 APPLICATION SHORT LEG SPLINT: CPT | Mod: LT

## 2023-12-28 RX ORDER — OXYCODONE AND ACETAMINOPHEN 5; 325 MG/1; MG/1
1 TABLET ORAL
Status: COMPLETED | OUTPATIENT
Start: 2023-12-28 | End: 2023-12-28

## 2023-12-28 RX ADMIN — OXYCODONE AND ACETAMINOPHEN 1 TABLET: 5; 325 TABLET ORAL at 04:12

## 2023-12-28 NOTE — ED PROVIDER NOTES
Encounter Date: 12/28/2023    SCRIBE #1 NOTE: I, Beatrice Curry, am scribing for, and in the presence of,  Ellie Stanton MD. I have scribed the following portions of the note - Other sections scribed: HPI, ROS, PE.       History     Chief Complaint   Patient presents with    Leg Injury     Seen here 12/19/23 & had splint placed to left ankle. Has been unable to f/u with ortho. 2 days ago splint got wet so he removed it. Would like splint replaced     Moy Escalona is a 68 y.o. male, with a past medical history of DM, HLD, HTN, and paranoid schizophrenia, who presents to the ED with a left ankle Maisonneuve fracture that occurred on 12/19/23 with posterior splint placed per ortho recs. Patient was seen here on 12/19/23 and had splint placed to left ankle, but it got wet 2 days ago so he removed it. Patient is requesting for splint to be replaced.   Patient has not followed up with orthopedist yet, was unable to follow up with [facility] due to insurance coverage.    The history is provided by the patient. No  was used.     Review of patient's allergies indicates:  No Known Allergies  Past Medical History:   Diagnosis Date    AMS (altered mental status) 02/23/2022    Diabetes mellitus     High cholesterol     Hyperlipidemia     Hypertension     Paranoid schizophrenia      Past Surgical History:   Procedure Laterality Date    ABDOMINAL SURGERY       Family History   Problem Relation Age of Onset    Cancer Mother      Social History     Tobacco Use    Smoking status: Every Day     Current packs/day: 0.50     Types: Cigarettes    Smokeless tobacco: Never   Substance Use Topics    Alcohol use: Yes     Comment: occasionally    Drug use: No     Review of Systems   Constitutional:  Negative for fever.   HENT:  Negative for sore throat.    Respiratory:  Negative for cough and shortness of breath.    Cardiovascular:  Negative for chest pain and leg swelling.   Gastrointestinal:  Negative for  abdominal pain, diarrhea, nausea and vomiting.   Genitourinary:  Negative for dysuria and hematuria.   Musculoskeletal:  Positive for arthralgias (left ankle). Negative for back pain and neck pain.   Skin:  Negative for rash.   Neurological:  Negative for syncope.       Physical Exam     Initial Vitals [12/28/23 1415]   BP Pulse Resp Temp SpO2   134/83 (!) 120 20 98 °F (36.7 °C) 98 %      MAP       --         Physical Exam    Nursing note and vitals reviewed.  Constitutional: He appears well-developed and well-nourished.  Non-toxic appearance. No distress.   HENT:   Head: Normocephalic and atraumatic.   Eyes: Conjunctivae and EOM are normal.   Cardiovascular:  Normal rate, regular rhythm, normal heart sounds and intact distal pulses.           No murmur heard.  Pulmonary/Chest: Effort normal and breath sounds normal. No respiratory distress. He has no wheezes. He has no rhonchi.   Abdominal: Abdomen is soft. He exhibits no distension. There is no abdominal tenderness. There is no guarding.   Musculoskeletal:         General: No tenderness or edema.      Comments: L ankle bilateral mall tenderness, edema and ecchymosis to L dorsum, DP 2+ L, warm well perfused L LE     Neurological: He is alert and oriented to person, place, and time.   Skin: Skin is warm and dry.   Psychiatric: He has a normal mood and affect.         ED Course   Procedures  Labs Reviewed   POCT GLUCOSE - Abnormal; Notable for the following components:       Result Value    POCT Glucose 188 (*)     All other components within normal limits          Imaging Results              X-Ray Ankle Complete Left (Final result)  Result time 12/28/23 16:41:26      Final result by Den Ch MD (12/28/23 16:41:26)                   Impression:      No significant change from the recent prior study.  See prior study report also.  Recommend orthopedic follow-up      Electronically signed by: Den hC  Date:    12/28/2023  Time:    16:41                Narrative:    EXAMINATION:  XR ANKLE COMPLETE 3 VIEW LEFT    CLINICAL HISTORY:  Other fracture of unspecified lower leg, initial encounter for closed fracture patient got splint wet, return to emergency room for splint replacement    TECHNIQUE:  AP, lateral and oblique views of the left ankle were performed.    COMPARISON:  12/19/2023    FINDINGS:  Subacute fracture of the distal tibia medial malleolus.  Mild displacement.    Minimal avulsion fracture of the posterior malleolus is better visualized on the prior study.    Stable mild widening of the medial clear space suggesting mild ankle subluxation.    Proximal fibular fracture is noted on the prior study superior to the field of view.    No significant change from the recent prior study.                                       Medications   oxyCODONE-acetaminophen 5-325 mg per tablet 1 tablet (1 tablet Oral Given 12/28/23 1645)     Medical Decision Making  Moy Escalona is a 68 y.o. male, with a past medical history of DM, HLD, HTN, and paranoid schizophrenia, who presents to the ED with a left ankle Maisonneuve fracture that occurred on 12/19/23 with posterior splint placed per ortho recs.     Diff: maisonneuve +/- ligament sprain vs compartment syndrome  Compartments of LLE soft, with DP 2+, no sign of compartment syndrome or vascular perfusion issue at this time.  Given instability of maisonneuve, placed in posterior splint with stirrup for improved immobilization, discussed strict nonweight bearing instructions and need for follow up with [facility] ortho this week, given number to make ortho follow up at [facility].  XR obtained to revisualize fracture in case of worsening instablity and need for ortho reconsult, however fracture appears to be in the same state with no worsened widening of mortise.    I discussed with the patient/family the diagnosis, treatment plan, indications for return to the emergency department, as well as for expected follow-up. The patient/family  verbalized an understanding. The patient/family is asked if there were any questions or concerns, which were addressed to patient/family satisfaction. Patient/family understands and is agreeable to the plan.     DISCLAIMER: This note was prepared with bSafe voice recognition transcription software. Garbled syntax, mangled pronouns, and other bizarre constructions may be attributed to that software system.      Amount and/or Complexity of Data Reviewed  Labs: ordered.  Radiology: ordered.    Risk  Prescription drug management.            Scribe Attestation:   Scribe #1: I performed the above scribed service and the documentation accurately describes the services I performed. I attest to the accuracy of the note.        ED Course as of 12/28/23 2052   Thu Dec 28, 2023   1707 Maisonneuve fracture revisualized, not worsened [LF]      ED Course User Index  [LF] Ellie Stanton MD I, Lorraine Fei, personally performed the services described in this documentation.  All medical record entries made by the scribe were at my direction and in my presence.  I have reviewed the chart and agree that the record reflects my personal performance and is accurate and complete.   Clinical Impression:  Final diagnoses:  [S82.899A] Ankle fracture  [S82.865G] Closed nondisplaced Maisonneuve fracture of left lower extremity with delayed healing, subsequent encounter (Primary)          ED Disposition Condition    Discharge Stable          ED Prescriptions    None       Follow-up Information       Follow up With Specialties Details Why Contact [facility] Oncology, Orthopedic Surgery, Genetics, Physical Medicine and Rehabilitation, Occupational Therapy, Radiology Schedule an appointment as soon as possible for a visit in 3 days ortho Maisonneuve fracture [address] [place] [address]  [phone]               Ellie Stanton MD  12/28/23 2055

## 2023-12-28 NOTE — DISCHARGE INSTRUCTIONS
You were seen in the ER today for your ankle fracture, you have a Maisonneuve fracture which will likely require surgery, please follow up with Orthopedics at [facility] as soon as you can, or present to the ER at [facility] if you have significant worsening pain, fever/chills/nausea/vomiting. You must use crutches and cannot bear weight on your left leg.

## 2024-05-29 ENCOUNTER — HOSPITAL ENCOUNTER (EMERGENCY)
Facility: HOSPITAL | Age: 69
Discharge: HOME | End: 2024-05-29
Attending: EMERGENCY MEDICINE
Payer: MEDICARE

## 2024-05-29 VITALS
DIASTOLIC BLOOD PRESSURE: 96 MMHG | BODY MASS INDEX: 28.14 KG/M2 | WEIGHT: 190 LBS | HEIGHT: 69 IN | OXYGEN SATURATION: 94 % | RESPIRATION RATE: 20 BRPM | HEART RATE: 94 BPM | SYSTOLIC BLOOD PRESSURE: 150 MMHG | TEMPERATURE: 99 F

## 2024-05-29 DIAGNOSIS — K21.9 GASTROESOPHAGEAL REFLUX DISEASE, UNSPECIFIED WHETHER ESOPHAGITIS PRESENT: Primary | ICD-10-CM

## 2024-05-29 DIAGNOSIS — R07.9 CHEST PAIN: ICD-10-CM

## 2024-05-29 LAB
ABO + RH BLD: NORMAL
ALBUMIN SERPL BCP-MCNC: 3.6 G/DL (ref 3.5–5.2)
ALP SERPL-CCNC: 92 U/L (ref 55–135)
ALT SERPL W/O P-5'-P-CCNC: 55 U/L (ref 10–44)
AMPHET+METHAMPHET UR QL: NEGATIVE
ANION GAP SERPL CALC-SCNC: 11 MMOL/L (ref 8–16)
APTT PPP: 29.9 SEC (ref 21–32)
AST SERPL-CCNC: 36 U/L (ref 10–40)
BACTERIA #/AREA URNS HPF: NORMAL /HPF
BARBITURATES UR QL SCN>200 NG/ML: NEGATIVE
BASOPHILS # BLD AUTO: 0.07 K/UL (ref 0–0.2)
BASOPHILS NFR BLD: 0.7 % (ref 0–1.9)
BENZODIAZ UR QL SCN>200 NG/ML: NEGATIVE
BILIRUB SERPL-MCNC: 0.6 MG/DL (ref 0.1–1)
BILIRUB UR QL STRIP: NEGATIVE
BLD GP AB SCN CELLS X3 SERPL QL: NORMAL
BNP SERPL-MCNC: 52 PG/ML (ref 0–99)
BUN SERPL-MCNC: 12 MG/DL (ref 8–23)
BZE UR QL SCN: NEGATIVE
CALCIUM SERPL-MCNC: 9.5 MG/DL (ref 8.7–10.5)
CANNABINOIDS UR QL SCN: NEGATIVE
CHLORIDE SERPL-SCNC: 102 MMOL/L (ref 95–110)
CLARITY UR: CLEAR
CO2 SERPL-SCNC: 23 MMOL/L (ref 23–29)
COLOR UR: YELLOW
CREAT SERPL-MCNC: 0.8 MG/DL (ref 0.5–1.4)
CREAT UR-MCNC: 87.9 MG/DL (ref 23–375)
DIFFERENTIAL METHOD BLD: ABNORMAL
EOSINOPHIL # BLD AUTO: 0.2 K/UL (ref 0–0.5)
EOSINOPHIL NFR BLD: 1.9 % (ref 0–8)
ERYTHROCYTE [DISTWIDTH] IN BLOOD BY AUTOMATED COUNT: 14.4 % (ref 11.5–14.5)
EST. GFR  (NO RACE VARIABLE): >60 ML/MIN/1.73 M^2
ETHANOL SERPL-MCNC: <10 MG/DL
GLUCOSE SERPL-MCNC: 143 MG/DL (ref 70–110)
GLUCOSE UR QL STRIP: ABNORMAL
HCT VFR BLD AUTO: 45.2 % (ref 40–54)
HGB BLD-MCNC: 15.2 G/DL (ref 14–18)
HGB UR QL STRIP: NEGATIVE
IMM GRANULOCYTES # BLD AUTO: 0.04 K/UL (ref 0–0.04)
IMM GRANULOCYTES NFR BLD AUTO: 0.4 % (ref 0–0.5)
INR PPP: 1 (ref 0.8–1.2)
KETONES UR QL STRIP: NEGATIVE
LACTATE SERPL-SCNC: 1.7 MMOL/L (ref 0.5–2.2)
LEUKOCYTE ESTERASE UR QL STRIP: NEGATIVE
LYMPHOCYTES # BLD AUTO: 2.7 K/UL (ref 1–4.8)
LYMPHOCYTES NFR BLD: 27.9 % (ref 18–48)
MCH RBC QN AUTO: 31.9 PG (ref 27–31)
MCHC RBC AUTO-ENTMCNC: 33.6 G/DL (ref 32–36)
MCV RBC AUTO: 95 FL (ref 82–98)
METHADONE UR QL SCN>300 NG/ML: NEGATIVE
MICROSCOPIC COMMENT: NORMAL
MONOCYTES # BLD AUTO: 0.6 K/UL (ref 0.3–1)
MONOCYTES NFR BLD: 6.6 % (ref 4–15)
NEUTROPHILS # BLD AUTO: 5.9 K/UL (ref 1.8–7.7)
NEUTROPHILS NFR BLD: 62.5 % (ref 38–73)
NITRITE UR QL STRIP: NEGATIVE
NRBC BLD-RTO: 0 /100 WBC
OPIATES UR QL SCN: NEGATIVE
PCP UR QL SCN>25 NG/ML: NEGATIVE
PH UR STRIP: 7 [PH] (ref 5–8)
PLATELET # BLD AUTO: 267 K/UL (ref 150–450)
PMV BLD AUTO: 8.4 FL (ref 9.2–12.9)
POCT GLUCOSE: 156 MG/DL (ref 70–110)
POCT GLUCOSE: 203 MG/DL (ref 70–110)
POTASSIUM SERPL-SCNC: 3.8 MMOL/L (ref 3.5–5.1)
PROT SERPL-MCNC: 7.3 G/DL (ref 6–8.4)
PROT UR QL STRIP: NEGATIVE
PROTHROMBIN TIME: 11.4 SEC (ref 9–12.5)
RBC # BLD AUTO: 4.77 M/UL (ref 4.6–6.2)
SODIUM SERPL-SCNC: 136 MMOL/L (ref 136–145)
SP GR UR STRIP: 1.01 (ref 1–1.03)
SPECIMEN OUTDATE: NORMAL
TOXICOLOGY INFORMATION: NORMAL
TROPONIN I SERPL DL<=0.01 NG/ML-MCNC: 0.01 NG/ML (ref 0–0.03)
URN SPEC COLLECT METH UR: ABNORMAL
UROBILINOGEN UR STRIP-ACNC: NEGATIVE EU/DL
WBC # BLD AUTO: 9.51 K/UL (ref 3.9–12.7)
YEAST URNS QL MICRO: NORMAL

## 2024-05-29 PROCEDURE — 93010 ELECTROCARDIOGRAM REPORT: CPT | Mod: ,,, | Performed by: INTERNAL MEDICINE

## 2024-05-29 PROCEDURE — 96374 THER/PROPH/DIAG INJ IV PUSH: CPT

## 2024-05-29 PROCEDURE — 85610 PROTHROMBIN TIME: CPT | Performed by: EMERGENCY MEDICINE

## 2024-05-29 PROCEDURE — C9113 INJ PANTOPRAZOLE SODIUM, VIA: HCPCS | Performed by: EMERGENCY MEDICINE

## 2024-05-29 PROCEDURE — 63600175 PHARM REV CODE 636 W HCPCS: Performed by: EMERGENCY MEDICINE

## 2024-05-29 PROCEDURE — 25000003 PHARM REV CODE 250: Performed by: EMERGENCY MEDICINE

## 2024-05-29 PROCEDURE — 83605 ASSAY OF LACTIC ACID: CPT | Performed by: EMERGENCY MEDICINE

## 2024-05-29 PROCEDURE — 84484 ASSAY OF TROPONIN QUANT: CPT | Performed by: EMERGENCY MEDICINE

## 2024-05-29 PROCEDURE — 99285 EMERGENCY DEPT VISIT HI MDM: CPT | Mod: 25

## 2024-05-29 PROCEDURE — 82077 ASSAY SPEC XCP UR&BREATH IA: CPT | Performed by: EMERGENCY MEDICINE

## 2024-05-29 PROCEDURE — 93005 ELECTROCARDIOGRAM TRACING: CPT

## 2024-05-29 PROCEDURE — 80307 DRUG TEST PRSMV CHEM ANLYZR: CPT | Performed by: EMERGENCY MEDICINE

## 2024-05-29 PROCEDURE — 85730 THROMBOPLASTIN TIME PARTIAL: CPT | Performed by: EMERGENCY MEDICINE

## 2024-05-29 PROCEDURE — 80053 COMPREHEN METABOLIC PANEL: CPT | Performed by: EMERGENCY MEDICINE

## 2024-05-29 PROCEDURE — 85025 COMPLETE CBC W/AUTO DIFF WBC: CPT | Performed by: EMERGENCY MEDICINE

## 2024-05-29 PROCEDURE — 83880 ASSAY OF NATRIURETIC PEPTIDE: CPT | Performed by: EMERGENCY MEDICINE

## 2024-05-29 PROCEDURE — 86850 RBC ANTIBODY SCREEN: CPT | Performed by: EMERGENCY MEDICINE

## 2024-05-29 PROCEDURE — 82962 GLUCOSE BLOOD TEST: CPT

## 2024-05-29 PROCEDURE — 96361 HYDRATE IV INFUSION ADD-ON: CPT

## 2024-05-29 PROCEDURE — 81000 URINALYSIS NONAUTO W/SCOPE: CPT | Mod: 59 | Performed by: EMERGENCY MEDICINE

## 2024-05-29 RX ORDER — PANTOPRAZOLE SODIUM 40 MG/10ML
40 INJECTION, POWDER, LYOPHILIZED, FOR SOLUTION INTRAVENOUS
Status: COMPLETED | OUTPATIENT
Start: 2024-05-29 | End: 2024-05-29

## 2024-05-29 RX ORDER — HYDROCODONE BITARTRATE AND ACETAMINOPHEN 5; 325 MG/1; MG/1
1 TABLET ORAL
Status: COMPLETED | OUTPATIENT
Start: 2024-05-29 | End: 2024-05-29

## 2024-05-29 RX ORDER — ALUMINUM HYDROXIDE, MAGNESIUM HYDROXIDE, AND SIMETHICONE 1200; 120; 1200 MG/30ML; MG/30ML; MG/30ML
30 SUSPENSION ORAL ONCE
Status: COMPLETED | OUTPATIENT
Start: 2024-05-29 | End: 2024-05-29

## 2024-05-29 RX ORDER — HYDROCODONE BITARTRATE AND ACETAMINOPHEN 5; 325 MG/1; MG/1
1 TABLET ORAL EVERY 6 HOURS PRN
Qty: 10 TABLET | Refills: 0 | Status: SHIPPED | OUTPATIENT
Start: 2024-05-29

## 2024-05-29 RX ORDER — FAMOTIDINE 20 MG/1
40 TABLET, FILM COATED ORAL 2 TIMES DAILY
Qty: 120 TABLET | Refills: 11 | Status: SHIPPED | OUTPATIENT
Start: 2024-05-29 | End: 2025-05-29

## 2024-05-29 RX ORDER — SUCRALFATE 1 G/1
1 TABLET ORAL 4 TIMES DAILY
Qty: 56 TABLET | Refills: 0 | Status: SHIPPED | OUTPATIENT
Start: 2024-05-29 | End: 2024-06-12

## 2024-05-29 RX ORDER — LIDOCAINE HYDROCHLORIDE 20 MG/ML
15 SOLUTION OROPHARYNGEAL ONCE
Status: COMPLETED | OUTPATIENT
Start: 2024-05-29 | End: 2024-05-29

## 2024-05-29 RX ADMIN — ALUMINUM HYDROXIDE, MAGNESIUM HYDROXIDE, AND SIMETHICONE 30 ML: 1200; 120; 1200 SUSPENSION ORAL at 03:05

## 2024-05-29 RX ADMIN — HYDROCODONE BITARTRATE AND ACETAMINOPHEN 1 TABLET: 5; 325 TABLET ORAL at 04:05

## 2024-05-29 RX ADMIN — SODIUM CHLORIDE, POTASSIUM CHLORIDE, SODIUM LACTATE AND CALCIUM CHLORIDE 1000 ML: 600; 310; 30; 20 INJECTION, SOLUTION INTRAVENOUS at 03:05

## 2024-05-29 RX ADMIN — PANTOPRAZOLE SODIUM 40 MG: 40 INJECTION, POWDER, FOR SOLUTION INTRAVENOUS at 03:05

## 2024-05-29 RX ADMIN — LIDOCAINE HYDROCHLORIDE 15 ML: 20 SOLUTION ORAL at 03:05

## 2024-05-29 NOTE — ED PROVIDER NOTES
"Encounter Date: 5/29/2024    SCRIBE #1 NOTE: I, Karen Jeong, am scribing for, and in the presence of,  Jostin Barrett MD. I have scribed the following portions of the note - Other sections scribed: HPI, ROS.       History     Chief Complaint   Patient presents with    Chest Pain    Rectal Bleeding     Pt reports intermittent midsternal chest pain that started yesterday morning around 5am. Pt states his chest pain would radiate down both arms accompanied with numbness of the hands. Pt reports hx of a stroke 1 yr ago and takes ASA daily. Pt is also reporting bloody stool/clots that started this past Monday and stopped this morning. +ETOH use on Monday.  Reports feeling light headed.       68 y. o. male with a PMHx of paranoid schizophrenia, AMS, type 2 DM, HLD, HTN, who presents to the ED for a chief complaint of intermittent midsternal chest pain that began a month ago and recurred yesterday morning around 5 AM. Patient reports his chest pain would radiate down bilateral arms and bilateral ribs accompanied with numbness of bilateral hands. Further reports he drank quite a good amount of alcohol on Monday and states "I have chest pain before but my chest pain got worse after drinking." Patient also reports one episode of light red hematochezia without rectal pain after ETOH use on Monday. Patient reports he has normal non-bloody BM this morning. Currently endorses mild suprapubic abdominal cramping. No attempted Tx for the Sx. Endorses Hx of CVA a year ago and reports taking asa daily for it. Patient states that he had a closed traumatic displaced Maisonneuve fracture of left lower extremity at the end of 2023, was seen in this ED on 12/28/23 and by orthopedic, Dr. Jean Carlos Geiger at [facility], on 01/02/24, but hasn't had any chance to have f/u appointment with orthopedic after that. No other alleviating or exacerbating factors. Denies Hx of cardiac stent placement, MI, or CAD. Also " denies on blood thinner. NKDA.     The history is provided by the patient. No  was used.     Review of patient's allergies indicates:  No Known Allergies  Past Medical History:   Diagnosis Date    AMS (altered mental status) 02/23/2022    Diabetes mellitus     High cholesterol     Hyperlipidemia     Hypertension     Paranoid schizophrenia      Past Surgical History:   Procedure Laterality Date    ABDOMINAL SURGERY       Family History   Problem Relation Name Age of Onset    Cancer Mother       Social History     Tobacco Use    Smoking status: Every Day     Current packs/day: 0.50     Types: Cigarettes    Smokeless tobacco: Never   Substance Use Topics    Alcohol use: Yes     Comment: occasionally    Drug use: No     Review of Systems   Constitutional: Negative.    HENT: Negative.     Eyes: Negative.    Respiratory: Negative.     Cardiovascular:  Positive for chest pain (intermittent, midsternal).   Gastrointestinal:  Positive for blood in stool (1 episode, is resolved now). Negative for rectal pain.        (+) suprapubic abdominal cramping    Genitourinary: Negative.    Musculoskeletal: Negative.         (+) pain to bilateral arms and bilateral ribs   Skin: Negative.    Neurological:  Positive for numbness (bilateral hands).       Physical Exam     Initial Vitals [05/29/24 1356]   BP Pulse Resp Temp SpO2   132/80 108 20 98.7 °F (37.1 °C) 97 %      MAP       --         Physical Exam    Nursing note and vitals reviewed.  Constitutional: He appears well-developed and well-nourished. He is not diaphoretic. No distress.   HENT:   Head: Normocephalic and atraumatic.   Nose: Nose normal.   Mouth/Throat: No oropharyngeal exudate.   Eyes: EOM are normal. Pupils are equal, round, and reactive to light.   Neck: Neck supple. No tracheal deviation present. No JVD present.   Normal range of motion.  Cardiovascular:  Normal rate, regular rhythm, normal heart sounds and intact distal pulses.            Pulmonary/Chest: Breath sounds normal. No respiratory distress. He has no wheezes. He has no rhonchi. He has no rales. He exhibits tenderness.   Abdominal: Abdomen is soft. Bowel sounds are normal. He exhibits no distension. There is abdominal tenderness (mild epigastric ttp). There is no rebound and no guarding.   Musculoskeletal:         General: No tenderness or edema. Normal range of motion.      Cervical back: Normal range of motion and neck supple.     Neurological: He is alert and oriented to person, place, and time. He has normal strength.   Skin: Skin is warm and dry. Capillary refill takes less than 2 seconds. No rash noted. No erythema.         ED Course   Procedures  Labs Reviewed   CBC W/ AUTO DIFFERENTIAL - Abnormal; Notable for the following components:       Result Value    MCH 31.9 (*)     MPV 8.4 (*)     All other components within normal limits   COMPREHENSIVE METABOLIC PANEL - Abnormal; Notable for the following components:    Glucose 143 (*)     ALT 55 (*)     All other components within normal limits   URINALYSIS, REFLEX TO URINE CULTURE - Abnormal; Notable for the following components:    Glucose, UA 3+ (*)     All other components within normal limits    Narrative:     Specimen Source->Urine   POCT GLUCOSE - Abnormal; Notable for the following components:    POCT Glucose 203 (*)     All other components within normal limits   POCT GLUCOSE - Abnormal; Notable for the following components:    POCT Glucose 156 (*)     All other components within normal limits   TROPONIN I   B-TYPE NATRIURETIC PEPTIDE   PROTIME-INR   APTT   ALCOHOL,MEDICAL (ETHANOL)   DRUG SCREEN PANEL, URINE EMERGENCY    Narrative:     Specimen Source->Urine   LACTIC ACID, PLASMA   URINALYSIS MICROSCOPIC    Narrative:     Specimen Source->Urine   TYPE & SCREEN        ECG Results              EKG 12-lead (Final result)        Collection Time Result Time QRS Duration OHS QTC Calculation    05/29/24 13:46:55 06/01/24 19:49:54 138  522                     Final result by Interface, Lab [facility] (06/01/24 19:50:00)                   Narrative:    Test Reason : R07.9,    Vent. Rate : 108 BPM     Atrial Rate : 108 BPM     P-R Int : 150 ms          QRS Dur : 138 ms      QT Int : 390 ms       P-R-T Axes : 034 -60 017 degrees     QTc Int : 522 ms    Sinus tachycardia  Right bundle branch block  Left anterior fascicular block   Bifascicular block   Minimal voltage criteria for LVH, may be normal variant ( R in aVL )  T wave abnormality, consider lateral ischemia  Abnormal ECG  When compared with ECG of 23-AUG-2022 14:07,  Significant changes have occurred  Confirmed by Edilberto CAMEJO, Quentin JENKINS ([id number]) on 6/1/2024 7:49:53 PM    Referred By: [email or web address]   SELF           Confirmed By:Quentin Casanova MD                                     EKG 12-lead (Final result)  Result time 05/30/24 11:14:41      Final result by Unknown User (05/30/24 11:14:41)                                      Imaging Results              X-Ray Chest 1 View (Final result)  Result time 05/29/24 15:05:21      Final result by Dewayne Griffith MD (05/29/24 15:05:21)                   Impression:      Linear densities within the right lung base and mid left lung likely representing linear atelectasis.    Lungs are otherwise clear.      Electronically signed by: Dewayne Griffith MD  Date:    05/29/2024  Time:    15:05               Narrative:    EXAMINATION:  XR CHEST 1 VIEW    CLINICAL HISTORY:  Chest pain, unspecified    TECHNIQUE:  Single frontal view of the chest was performed.    COMPARISON:  08/23/2022.    FINDINGS:  The heart is not enlarged.  Superior mediastinal structures are unremarkable.  Pulmonary vasculature is within normal limits.  There is a linear density within the right lung base and there is a linear density within the mid left lung likely representing linear atelectasis.  Lungs are otherwise clear.  There is no evidence for pneumothorax or large pleural effusions.  Bony  structures are grossly intact..                                       Medications   lactated ringers bolus 1,000 mL (0 mLs Intravenous Stopped 5/29/24 1613)   aluminum-magnesium hydroxide-simethicone 200-200-20 mg/5 mL suspension 30 mL (30 mLs Oral Given 5/29/24 1511)     And   LIDOcaine viscous HCl 2% oral solution 15 mL (15 mLs Oral Given 5/29/24 1511)   pantoprazole injection 40 mg (40 mg Intravenous Given 5/29/24 1510)   HYDROcodone-acetaminophen 5-325 mg per tablet 1 tablet (1 tablet Oral Given 5/29/24 1642)     Medical Decision Making  Amount and/or Complexity of Data Reviewed  Labs: ordered. Decision-making details documented in ED Course.  Radiology: ordered. Decision-making details documented in ED Course.  ECG/medicine tests: ordered and independent interpretation performed. Decision-making details documented in ED Course.    Risk  OTC drugs.  Prescription drug management.      MDM:    68-year-old male with past medical history as noted above presenting with chest pain and rectal bleeding.  Differential Diagnosis includes:  Acute GI bleed, upper GI bleed, alcoholic gastritis, pancreatitis, intoxication, acute mi/ACS.  Physical exam as noted above.  ED workup notable for EKG as noted below, urinalysis 3+ sugar, UDS negative, white blood cell 9.51, hemoglobin 15.2, CMP with creatinine 0.8, glucose 143, troponin 0.012, BNP 52, INR 1.0, alcohol negative, lactate 1.7, chest x-ray shows some chronic appearing findings no acute.  Patient presentation for extremely atypical chest pain somewhat reproducible on exam suspected to be musculoskeletal, some mild epigastric tenderness without evidence of pancreatitis stable vitals and patient tolerating oral liquids at this point may be related to alcoholic gastritis.  He has had 1 episode rectal bleeding painful subsequent bowel movements without blood noted.  He has no evidence of ongoing bleeding noted.  He will need follow-up in clinic with the primary care physician  for reassessment and ongoing treatment.  He appears well upon reassessment without further complaint. Do not suspect any additional surgical or medical emergency. Discussed diagnosis and further treatment with patient, including f/u.  Return precautions given and all questions answered.  Patient in understanding of plan.  Pt discharged to home improved and stable.        Note was created using voice recognition software. Note may have occasional typographical or grammatical errors, garbled syntax, and other bizarre constructions that may not have been identified and edited despite good lyndsay initial review prior to signing.             Scribe Attestation:   Scribe #1: I performed the above scribed service and the documentation accurately describes the services I performed. I attest to the accuracy of the note.    Scribe attestation: I, Jostin Barrett M.D., personally performed the services described in this documentation. All medical record entries made by the scribe were at my direction and in my presence.  I have reviewed the chart and agree that the record reflects my personal performance and is accurate and complete.      ED Course as of 06/03/24 1109   Wed May 29, 2024   1455 EKG shows sinus tachycardia rate of 108 beats per minute, right bundle-branch block, left anterior fascicular block, nonspecific ST and T-wave changes noted throughout, normal-appearing EKG, similar to prior,  MS, no STEMI. [BB]      ED Course User Index  [BB] Jostin Barrett MD                           Clinical Impression:  Final diagnoses:  [R07.9] Chest pain  [K21.9] Gastroesophageal reflux disease, unspecified whether esophagitis present (Primary)          ED Disposition Condition    Discharge Stable          ED Prescriptions       Medication Sig Dispense Start Date End Date Auth. Provider    famotidine (PEPCID) 20 MG tablet Take 2 tablets (40 mg total) by mouth 2 (two) times daily. 120 tablet 5/29/2024 5/29/2025  Jostin Barrett MD    sucralfate (CARAFATE) 1 gram tablet Take 1 tablet (1 g total) by mouth 4 (four) times daily. for 14 days 56 tablet 5/29/2024 6/12/2024 Jostin Barrett MD    HYDROcodone-acetaminophen (NORCO) 5-325 mg per tablet Take 1 tablet by mouth every 6 (six) hours as needed for Pain. 10 tablet 5/29/2024 -- Jostin Barrett MD          Follow-up Information       Follow up With Specialties Details Why Contact Info Additional Information    [facility] Emergency Dept Emergency Medicine Go to  If symptoms worsen [address] [facility] [place] [address] [phone]     Uriel Jeong MD Family Medicine Go in 1 week  [address] [facility] [address]  [phone]       [facility] Gastro[facility] Gastroenterology Schedule an appointment as soon as possible for a visit   [address]  [address]  [facility] [address] [phone] Please park in garage or surface lot and use Medical Office Bldg entrance. Check in at [address]    [facility] Cardiology Cardiology Schedule an appointment as soon as possible for a visit   [address]  [facility] [address] [phone] Please park in garage or Medical Office Bldg. surface lot and use Medical Ofc Bldg elevator. Check in at [facility] [address].             Jostin Barrett MD  06/03/24 3823

## 2024-06-01 LAB
OHS QRS DURATION: 138 MS
OHS QTC CALCULATION: 522 MS

## 2025-03-24 ENCOUNTER — HOSPITAL ENCOUNTER (EMERGENCY)
Facility: HOSPITAL | Age: 70
Discharge: HOME | End: 2025-03-24
Attending: EMERGENCY MEDICINE
Payer: MEDICARE

## 2025-03-24 VITALS
TEMPERATURE: 98 F | DIASTOLIC BLOOD PRESSURE: 89 MMHG | OXYGEN SATURATION: 96 % | HEART RATE: 110 BPM | HEIGHT: 69 IN | SYSTOLIC BLOOD PRESSURE: 136 MMHG | WEIGHT: 199 LBS | RESPIRATION RATE: 18 BRPM | BODY MASS INDEX: 29.47 KG/M2

## 2025-03-24 DIAGNOSIS — G89.29 CHRONIC PAIN OF LEFT ANKLE: Primary | ICD-10-CM

## 2025-03-24 DIAGNOSIS — M79.606 LEG PAIN: ICD-10-CM

## 2025-03-24 DIAGNOSIS — M25.572 CHRONIC PAIN OF LEFT ANKLE: Primary | ICD-10-CM

## 2025-03-24 DIAGNOSIS — Z87.81 HISTORY OF FIBULA FRACTURE: ICD-10-CM

## 2025-03-24 DIAGNOSIS — S82.55XK CLOSED NONDISPLACED FRACTURE OF MEDIAL MALLEOLUS OF LEFT TIBIA WITH NONUNION, SUBSEQUENT ENCOUNTER: ICD-10-CM

## 2025-03-24 DIAGNOSIS — M25.579 ANKLE PAIN: ICD-10-CM

## 2025-03-24 DIAGNOSIS — S82.892K: ICD-10-CM

## 2025-03-24 PROCEDURE — 99283 EMERGENCY DEPT VISIT LOW MDM: CPT | Mod: 25

## 2025-03-24 NOTE — DISCHARGE INSTRUCTIONS
Please make an appointment with an orthopedic doctor.  Follow up with pain management doctor as well.  Thank you for coming to our Emergency Department today. It is important to remember that some problems or medical conditions are difficult to diagnose and may not be found or addressed during your Emergency Department visit.  These conditions often start with non-specific symptoms and can only be diagnosed on follow up visits with your primary care physician or specialist when the symptoms continue or change. Please remember that all medical conditions can change, and we cannot predict how you will be feeling tomorrow or the next day. Return to the ER with any questions/concerns, new/concerning symptoms, worsening or failure to improve.       Be sure to follow up with your primary care doctor and review all labs/imaging/tests that were performed during your ER visit with them. It is very common for us to identify non-emergent incidental findings which must be followed up with your primary care physician.  Some labs/imaging/tests may be outside of the normal range, and require non-emergent follow-up and/or further investigation/treatment/procedures/testing to help diagnose/exclude/prevent complications or other potentially serious medical conditions. Some abnormalities may not have been discussed or addressed during your ER visit. Some lab results may not return during your ER visit but can be accessible by downloading the free [facility] mayda or by visiting [email or web address] . It is important for you to review all labs/imaging/tests which are outside of the normal range with your physician.    An ER visit does not replace a primary care visit, and many screening tests or follow-up tests cannot be ordered by an ER doctor or performed by the ER. Some tests may even require pre-approval.    If you do not have a primary care doctor, you may contact the one listed on your discharge paperwork or you may also  call the [facility] Appointment Desk at [phone] , or [address] [place] [address] at  [phone] to schedule an appointment, or establish care with a primary care doctor or even a specialist and to obtain information about local resources. It is important to your health that you have a primary care doctor.    Please take all medications as directed. We have done our best to select a medication for you that will treat your condition however, all medications may potentially have side-effects and it is impossible to predict which medications may give you side-effects or what those side-effects (if any) those medications may give you.  If you feel that you are having a negative effect or side-effect of any medication you should stop taking those medications immediately and seek medical attention. If you feel that you are having a life-threatening reaction call 911.        Do not drive, swim, climb to height, take a bath, operate heavy machinery, drink alcohol or take potentially sedating medications, sign any legal documents or make any important decisions for 24 hours if you have received any pain medications, sedatives or mood altering drugs during your ER visit or within 24 hours of taking them if they have been prescribed to you.     You can find additional resources for Dentists, hearing aids, durable medical equipment, low cost pharmacies and other resources at [email or web address]

## 2025-03-24 NOTE — ED PROVIDER NOTES
Encounter Date: 3/24/2025    SCRIBE #1 NOTE: IXiomara, am scribing for, and in the presence of,  Kp Vann PA-C. I have scribed the following portions of the note - Other sections scribed: HPI, ROS.       History     Chief Complaint   Patient presents with    Ankle Pain     PT reports broke his ankle/foot 1year ago. Pt reports chronic pain to foot. No new injury or trauma      Moy Escalona is a 69 y.o. male, with a PMHx of DM, HLD, HTN, who presents to the ED with chronic pain to his left ankle and mid calf. Patient states he broke his ankle in 2023 and took the cast off too early. He is able to ambulate with increased pain. Patient reports he follows up with a pain doctor who sent him to the ER for an ankle xray. Patient is prescribed Norco for chronic pain, last dose 3 hours ago. No other exacerbating or alleviating factors. Denies fever, chest pain, SOB, leg swelling, cough, back pain, or other associated symptoms. Denies hx of cancer, DVT, or PE. Denies recent extended travel. Denies follow up with orthopedics. Notes upcoming appointment with pain doctor 4/2/2025.       The history is provided by the patient. No  was used.     Review of patient's allergies indicates:  No Known Allergies  Past Medical History:   Diagnosis Date    AMS (altered mental status) 02/23/2022    Diabetes mellitus     High cholesterol     Hyperlipidemia     Hypertension     Paranoid schizophrenia      Past Surgical History:   Procedure Laterality Date    ABDOMINAL SURGERY       Family History   Problem Relation Name Age of Onset    Cancer Mother       Social History[1]  Review of Systems   Constitutional:  Negative for chills and fever.   HENT:  Negative for sore throat.    Eyes:  Negative for redness.   Respiratory:  Negative for cough and shortness of breath.    Cardiovascular:  Negative for chest pain and leg swelling.   Gastrointestinal:  Negative for abdominal pain, diarrhea, nausea and vomiting.    Genitourinary:  Negative for difficulty urinating, dysuria, frequency and urgency.   Musculoskeletal:  Positive for arthralgias. Negative for back pain, joint swelling and neck pain.   Skin:  Negative for rash.   Neurological:  Negative for syncope and headaches.   Psychiatric/Behavioral:  Negative for confusion.        Physical Exam     Initial Vitals [03/24/25 1404]   BP Pulse Resp Temp SpO2   136/89 110 18 97.9 °F (36.6 °C) 96 %      MAP       --         Physical Exam    Constitutional: He appears well-developed and well-nourished.  Non-toxic appearance. He does not have a sickly appearance. No distress.   HENT:   Head: Normocephalic and atraumatic.   Right Ear: External ear normal.   Left Ear: External ear normal. Mouth/Throat: No trismus in the jaw.   Eyes: Conjunctivae and lids are normal.   Neck: Trachea normal. Neck supple. No tracheal deviation present.   Normal range of motion.  Cardiovascular:  Normal rate, regular rhythm, S1 normal, S2 normal and normal heart sounds.     Exam reveals no S3 and no S4.       Pulmonary/Chest: Effort normal and breath sounds normal. No tachypnea and no bradypnea. No respiratory distress. He has no decreased breath sounds. He has no wheezes. He has no rhonchi. He has no rales.   Abdominal: He exhibits no distension.   Musculoskeletal:      Cervical back: Normal range of motion and neck supple.      Comments: There is mild swelling to the left ankle.  Patient has full range of motion in the left ankle and increases the pain.  2+ DP, PT pulses.  Capillary refill less than 2 seconds.  Patient is able to ambulate in the emergency department.  Full range of motion in the left knee.  5/5 strength in the left knee and left ankle.  Patient is able to move all his toes.  No tenderness Achilles tendon.     Neurological: He is alert and oriented to person, place, and time. GCS eye subscore is 4. GCS verbal subscore is 5. GCS motor subscore is 6.   Skin: Skin is warm and dry. No rash  noted.   Psychiatric: He has a normal mood and affect. His behavior is normal. Judgment and thought content normal.         ED Course   Procedures  Labs Reviewed - No data to display       Imaging Results              X-Ray Ankle Complete Left (Final result)  Result time 03/24/25 14:57:22      Final result by Miguel Angel Oliva III, MD (03/24/25 14:57:22)                   Narrative:    EXAMINATION:  XR ANKLE COMPLETE 3 VIEW LEFT    CLINICAL HISTORY:  Pain in unspecified ankle and joints of unspecified foot    FINDINGS:  Ankle complete three views left.    There is nonunion of a medial malleolar fracture.  There is deformity of the distal tibia from remote trauma and there is posttraumatic ossification of the distal tib fib interosseous membrane.  No acute new fracture dislocation bone destruction seen.      Electronically signed by: Miguel Angel Oliva MD  Date:    03/24/2025  Time:    14:57                                     X-Ray Tibia Fibula 2 View Left (Final result)  Result time 03/24/25 14:58:10      Final result by Miguel Angel Oliva III, MD (03/24/25 14:58:10)                   Narrative:    EXAMINATION:  XR TIBIA FIBULA 2 VIEW LEFT    CLINICAL HISTORY:  Pain in leg, unspecified    FINDINGS:  Tib fib two views left.    There is remote deformity from remote fracture of the distal tibia.  There is remote deformity from a remote fracture of the proximal fibula.  There is nonunion of a remote medial malleolar fracture.  No acute fracture dislocation bone destruction seen.      Electronically signed by: Miguel Angel Oliva MD  Date:    03/24/2025  Time:    14:58                                     Medications - No data to display  Medical Decision Making  Encounter Date: 3/24/2025    69 y.o. male presents for evaluation of left ankle pain.  Hemodynamically stable. Afebrile. Phonating and protecting the airway spontaneously. No clinical evidence for cardiovascular instability or impending airway compromise.  Remainder of  physical exam as above.   Prior medical records reviewed. PMD note reviewed. Current co-morbidities considered that will impact clinical decision making include as above.   Vitals range:   Temp:  [97.9 °F (36.6 °C)] 97.9 °F (36.6 °C)  Pulse:  [110] 110  Resp:  [18] 18  SpO2:  [96 %] 96 %  BP: (136)/(89) 136/89  Denies other injury, hip pain, foot pain, knee pain, fever, inability to bear weight on ankle, and numbness/tingling. Afebrile. Patient is non-toxic appearing and in no acute distress. Xray shows no acute fracture or dislocation.  X-ray does show  remote deformity from remote fracture of the distal tibia.  There is remote deformity from a remote fracture of the proximal fibula.  There is nonunion of a remote medial malleolar fracture.  Patient factor to his ankle and 2023, took the cast off too soon, and did not follow up with orthopedic doctor. No neurovascular compromise. Ambulatory. Given the above, I have considered but doubt septic joint, achilles tendon rupture, DVT, avascular necrosis, and gout.   Patient states he is in the emergency department today just to get an x-ray to show his pain management doctor.  Patient states he does not want any medication for pain.  I offered the patient a walking boot and crutches, but patient declined.  Patient states he has both of them at home and does not want anything in the emergency department.  I then offered to wrap patient's ankle with an Ace bandage, and patient also declined.  I advised patient that he should follow up with an orthopedic doctor along with his pain management doctor due to his poor healing fractures.  I will discharge patient home with ambulatory     ED MEDS GIVEN:  Medications - No data to display    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, subsequent  encounter  [Z87.81] History of fibula fracture  [S82.892K] Closed fracture of malleolus of left ankle with nonunion, subsequent encounter       I discussed with the patient/family the diagnosis, treatment plan, indications for return to the emergency department, and for expected follow-up. The patient/family verbalized an understanding. The patient/family is asked if there are any questions or concerns. We discuss the case, until all issues are addressed to the patient/family's satisfaction. Patient/family understands and is agreeable to the plan.   Kp Vann    DISCLAIMER: This note was prepared with [employer] voice recognition transcription software. Garbled syntax, mangled pronouns, and other bizarre constructions may be attributed to that software system.      Amount and/or Complexity of Data Reviewed  Radiology: ordered. Decision-making details documented in ED Course.            Scribe Attestation:   Scribe #1: I performed the above scribed service and the documentation accurately describes the services I performed. I attest to the accuracy of the note.                           I, Kp Vann PA-C, personally performed the services described in this documentation. All medical record entries made by the scribe were at my direction and in my presence. I have reviewed the chart and agree that the record reflects my personal performance and is accurate and complete.      DISCLAIMER: This note was prepared with [employer] voice recognition transcription software. Garbled syntax, mangled pronouns, and other bizarre constructions may be attributed to that software system.      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, subsequent encounter  [Z87.81] History of fibula fracture  [S82.892K] Closed fracture of malleolus of left ankle  with nonunion, subsequent encounter          ED Disposition Condition    Discharge Stable          ED Prescriptions    None       Follow-up Information       Follow up With Specialties Details Why Contact Info    Uriel Jeong MD Family Medicine Schedule an appointment as soon as possible for a visit  As needed, If symptoms worsen [address]  [place] [address]  [phone]      Ryan Melton MD Orthopedic Surgery Schedule an appointment as soon as possible for a visit in 1 day For further evaluation, more definitive management of symptoms [id number] [facility] [facility]  [address]  [facility] [address]  [phone]      [facility] Schedule an appointment as soon as possible for a visit in 1 day For further evaluation, more definitive management of symptoms [address] [facility] [address]  [phone]                   [1]   Social History  Tobacco Use    Smoking status: Every Day     Current packs/day: 0.50     Types: Cigarettes    Smokeless tobacco: Never   Substance Use Topics    Alcohol use: Yes     Comment: occasionally    Drug use: No        Kp Vann PA-C  03/24/25 6490

## 2025-04-17 ENCOUNTER — HOSPITAL ENCOUNTER (EMERGENCY)
Facility: HOSPITAL | Age: 70
Discharge: HOME | End: 2025-04-17
Attending: EMERGENCY MEDICINE
Payer: MEDICARE

## 2025-04-17 VITALS
HEART RATE: 109 BPM | WEIGHT: 199 LBS | RESPIRATION RATE: 18 BRPM | DIASTOLIC BLOOD PRESSURE: 86 MMHG | SYSTOLIC BLOOD PRESSURE: 155 MMHG | TEMPERATURE: 98 F | BODY MASS INDEX: 29.39 KG/M2 | OXYGEN SATURATION: 96 %

## 2025-04-17 DIAGNOSIS — S82.392P: ICD-10-CM

## 2025-04-17 DIAGNOSIS — S82.832P OTHER CLOSED FRACTURE OF DISTAL END OF LEFT FIBULA WITH MALUNION, SUBSEQUENT ENCOUNTER: ICD-10-CM

## 2025-04-17 DIAGNOSIS — J02.9 SORE THROAT: ICD-10-CM

## 2025-04-17 DIAGNOSIS — G89.29 CHRONIC PAIN OF LEFT ANKLE: Primary | ICD-10-CM

## 2025-04-17 DIAGNOSIS — M25.572 CHRONIC PAIN OF LEFT ANKLE: Primary | ICD-10-CM

## 2025-04-17 PROCEDURE — 99284 EMERGENCY DEPT VISIT MOD MDM: CPT

## 2025-04-17 RX ORDER — FLUTICASONE PROPIONATE 50 MCG
1 SPRAY, SUSPENSION (ML) NASAL 2 TIMES DAILY PRN
Qty: 15 G | Refills: 0 | Status: SHIPPED | OUTPATIENT
Start: 2025-04-17 | End: 2025-04-17

## 2025-04-17 RX ORDER — IBUPROFEN 600 MG/1
600 TABLET ORAL EVERY 6 HOURS PRN
Qty: 20 TABLET | Refills: 0 | Status: SHIPPED | OUTPATIENT
Start: 2025-04-17 | End: 2025-04-22

## 2025-04-17 RX ORDER — BENZONATATE 100 MG/1
100 CAPSULE ORAL 3 TIMES DAILY PRN
Qty: 20 CAPSULE | Refills: 0 | Status: SHIPPED | OUTPATIENT
Start: 2025-04-17 | End: 2025-04-17

## 2025-04-17 RX ORDER — BENZONATATE 100 MG/1
100 CAPSULE ORAL 3 TIMES DAILY PRN
Qty: 20 CAPSULE | Refills: 0 | Status: SHIPPED | OUTPATIENT
Start: 2025-04-17 | End: 2025-04-24

## 2025-04-17 RX ORDER — PHENOL 1.4 %
AEROSOL, SPRAY (ML) MUCOUS MEMBRANE
Qty: 177 ML | Refills: 0 | Status: SHIPPED | OUTPATIENT
Start: 2025-04-17 | End: 2025-04-17

## 2025-04-17 RX ORDER — PHENOL 1.4 %
AEROSOL, SPRAY (ML) MUCOUS MEMBRANE
Qty: 177 ML | Refills: 0 | Status: SHIPPED | OUTPATIENT
Start: 2025-04-17 | End: 2025-04-24

## 2025-04-17 RX ORDER — FLUTICASONE PROPIONATE 50 MCG
1 SPRAY, SUSPENSION (ML) NASAL 2 TIMES DAILY PRN
Qty: 15 G | Refills: 0 | Status: SHIPPED | OUTPATIENT
Start: 2025-04-17 | End: 2025-05-17

## 2025-04-17 RX ORDER — IBUPROFEN 600 MG/1
600 TABLET ORAL EVERY 6 HOURS PRN
Qty: 20 TABLET | Refills: 0 | Status: SHIPPED | OUTPATIENT
Start: 2025-04-17 | End: 2025-04-17
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
## 2025-04-17 NOTE — DISCHARGE INSTRUCTIONS

## 2025-04-30 DIAGNOSIS — R52 PAIN: Primary | ICD-10-CM
